# Patient Record
Sex: MALE | Race: AMERICAN INDIAN OR ALASKA NATIVE | NOT HISPANIC OR LATINO
[De-identification: names, ages, dates, MRNs, and addresses within clinical notes are randomized per-mention and may not be internally consistent; named-entity substitution may affect disease eponyms.]

---

## 2021-09-01 DIAGNOSIS — F63.9 IMPULSE DISORDER, UNSPECIFIED: ICD-10-CM

## 2021-09-01 DIAGNOSIS — S98.112A COMPLETE TRAUMATIC AMPUTATION OF LEFT GREAT TOE, INITIAL ENCOUNTER: ICD-10-CM

## 2021-09-23 ENCOUNTER — APPOINTMENT (OUTPATIENT)
Dept: PEDIATRIC DEVELOPMENTAL SERVICES | Facility: CLINIC | Age: 26
End: 2021-09-23
Payer: MEDICAID

## 2021-09-23 ENCOUNTER — NON-APPOINTMENT (OUTPATIENT)
Age: 26
End: 2021-09-23

## 2021-09-23 ENCOUNTER — OUTPATIENT (OUTPATIENT)
Dept: OUTPATIENT SERVICES | Facility: HOSPITAL | Age: 26
LOS: 1 days | Discharge: HOME | End: 2021-09-23
Payer: MEDICAID

## 2021-09-23 ENCOUNTER — APPOINTMENT (OUTPATIENT)
Dept: INTERNAL MEDICINE | Facility: CLINIC | Age: 26
End: 2021-09-23
Payer: MEDICAID

## 2021-09-23 VITALS
DIASTOLIC BLOOD PRESSURE: 76 MMHG | SYSTOLIC BLOOD PRESSURE: 116 MMHG | OXYGEN SATURATION: 99 % | TEMPERATURE: 97.1 F | HEART RATE: 91 BPM

## 2021-09-23 DIAGNOSIS — Z82.49 FAMILY HISTORY OF ISCHEMIC HEART DISEASE AND OTHER DISEASES OF THE CIRCULATORY SYSTEM: ICD-10-CM

## 2021-09-23 DIAGNOSIS — Z83.3 FAMILY HISTORY OF DIABETES MELLITUS: ICD-10-CM

## 2021-09-23 PROCEDURE — 93010 ELECTROCARDIOGRAM REPORT: CPT

## 2021-09-23 PROCEDURE — 99214 OFFICE O/P EST MOD 30 MIN: CPT | Mod: 25,GC

## 2021-09-23 RX ORDER — OMEPRAZOLE 40 MG/1
40 CAPSULE, DELAYED RELEASE ORAL
Qty: 42 | Refills: 0 | Status: COMPLETED | COMMUNITY
Start: 2021-09-23 | End: 2021-11-04

## 2021-09-23 NOTE — HISTORY OF PRESENT ILLNESS
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 Presented to the office to establish care. The patient was accompanied by 2 aides: Doreen Boyd and Yoselin Diaz ( manager of [facility] home).

## 2021-09-23 NOTE — END OF VISIT
[] : Resident [FreeTextEntry3] : Pt. is a 27yo male with autism, cerebral palsy, GERD, blind left eye due to congenital cataract, acne, s/p tumor removed from left foot.  Here to establish care.  Had Covid vaccine, Tdap and flu vaccine given today.  Blood work and EKG ordered.  RTC 3 months.  Neuro referral for seizure, ophthal. referral for congenital left cataract, and podiatry given h/o tumor removal from left foot\par

## 2021-09-23 NOTE — ASSESSMENT
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 and left foot tumor (unspecified, s/p left 2nd toe amputation), seizure disorder. Presented to the office to establish care. The patient was accompanied by 2 aides: Doreen Boyd and Yoselin Coy ( manager of residency home). \par \par #Seizure disorder:\par - On Topiramate 50mg po q12hrs and Valproic acid 750mg (15mL) po q12hrs for behavior disorder\par - No seizure activity since he has at his new residency\par - Neurology referral \par \par #GERD: \par - Patient complains of a sore throat. has been off PPIs\par - Restart Omeprazole 40mg po once daily for 6 weeks only\par \par #Mood disorder \par #Borderline Schizophrenia\par #ASD\par #Impulse disorder \par - Continue with Chlorpromazine 25mg po q12hrs, topiramate 50mg Q12 and valproic acid 750mg Q12\par - the patient is verbal with intellectual disability\par - Follow up QTc on EKG\par \par #Acne: \par - Continue Clindamycin 1% gel 2 times a day after showering\par - Controlled now\par - Aides requesting dermatology referral \par \par #Left foot tumor: \par - Unknown primary ( no tissue biopsy)\par - s/p 2nd left toe amputation\par - 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 \par - Covid-19 received. They do not have the full dates, to be documented next time.

## 2021-09-23 NOTE — PHYSICAL EXAM
[No Acute Distress] : no acute distress [Well Nourished] : well nourished [Well Developed] : well developed [Well-Appearing] : well-appearing [Normal Sclera/Conjunctiva] : normal sclera/conjunctiva [PERRL] : pupils equal round and reactive to light [EOMI] : extraocular movements intact [Normal Outer Ear/Nose] : the outer ears and nose were normal in appearance [Normal Oropharynx] : the oropharynx was normal [No JVD] : no jugular venous distention [Supple] : supple [No Respiratory Distress] : no respiratory distress  [No Accessory Muscle Use] : no accessory muscle use [Clear to Auscultation] : lungs were clear to auscultation bilaterally [Normal Rate] : normal rate  [Regular Rhythm] : with a regular rhythm [Normal S1, S2] : normal S1 and S2 [Soft] : abdomen soft [Non Tender] : non-tender [Non-distended] : non-distended [No HSM] : no HSM [No CVA Tenderness] : no CVA  tenderness [No Spinal Tenderness] : no spinal tenderness [No Joint Swelling] : no joint swelling [No Rash] : no rash [No Focal Deficits] : no focal deficits [Normal Gait] : normal gait [Normal] : normal gait, coordination grossly intact, no focal deficits and deep tendon reflexes were 2+ and symmetric [de-identified] : ASD, intellectual disability

## 2021-09-27 LAB
25(OH)D3 SERPL-MCNC: 61 NG/ML
ALBUMIN SERPL ELPH-MCNC: 4.4 G/DL
ALP BLD-CCNC: 82 U/L
ALT SERPL-CCNC: 13 U/L
ANION GAP SERPL CALC-SCNC: 13 MMOL/L
AST SERPL-CCNC: 24 U/L
BASOPHILS # BLD AUTO: 0.04 K/UL
BASOPHILS NFR BLD AUTO: 0.5 %
BILIRUB SERPL-MCNC: 0.3 MG/DL
BUN SERPL-MCNC: 19 MG/DL
CALCIUM SERPL-MCNC: 9.8 MG/DL
CHLORIDE SERPL-SCNC: 106 MMOL/L
CO2 SERPL-SCNC: 23 MMOL/L
CREAT SERPL-MCNC: 0.9 MG/DL
EOSINOPHIL # BLD AUTO: 0.34 K/UL
EOSINOPHIL NFR BLD AUTO: 4.6 %
ESTIMATED AVERAGE GLUCOSE: 114 MG/DL
GLUCOSE SERPL-MCNC: 84 MG/DL
HBA1C MFR BLD HPLC: 5.6 %
HCT VFR BLD CALC: 43.9 %
HGB BLD-MCNC: 13.3 G/DL
IMM GRANULOCYTES NFR BLD AUTO: 0.4 %
LYMPHOCYTES # BLD AUTO: 3.05 K/UL
LYMPHOCYTES NFR BLD AUTO: 41.4 %
MAN DIFF?: NORMAL
MCHC RBC-ENTMCNC: 22.6 PG
MCHC RBC-ENTMCNC: 30.3 G/DL
MCV RBC AUTO: 74.7 FL
MONOCYTES # BLD AUTO: 0.92 K/UL
MONOCYTES NFR BLD AUTO: 12.5 %
NEUTROPHILS # BLD AUTO: 2.99 K/UL
NEUTROPHILS NFR BLD AUTO: 40.6 %
PLATELET # BLD AUTO: 158 K/UL
POTASSIUM SERPL-SCNC: 4.5 MMOL/L
PROT SERPL-MCNC: 7.2 G/DL
RBC # BLD: 5.88 M/UL
RBC # FLD: 15.6 %
SODIUM SERPL-SCNC: 142 MMOL/L
TSH SERPL-ACNC: 4.34 UIU/ML
WBC # FLD AUTO: 7.37 K/UL

## 2021-09-29 DIAGNOSIS — K21.9 GASTRO-ESOPHAGEAL REFLUX DISEASE WITHOUT ESOPHAGITIS: ICD-10-CM

## 2021-09-29 DIAGNOSIS — G40.909 EPILEPSY, UNSPECIFIED, NOT INTRACTABLE, WITHOUT STATUS EPILEPTICUS: ICD-10-CM

## 2021-09-29 DIAGNOSIS — L70.9 ACNE, UNSPECIFIED: ICD-10-CM

## 2021-09-29 DIAGNOSIS — H54.40 BLINDNESS, ONE EYE, UNSPECIFIED EYE: ICD-10-CM

## 2021-09-29 DIAGNOSIS — Q12.0 CONGENITAL CATARACT: ICD-10-CM

## 2021-09-29 DIAGNOSIS — F84.0 AUTISTIC DISORDER: ICD-10-CM

## 2021-09-29 DIAGNOSIS — Z00.00 ENCOUNTER FOR GENERAL ADULT MEDICAL EXAMINATION WITHOUT ABNORMAL FINDINGS: ICD-10-CM

## 2021-09-29 DIAGNOSIS — Z23 ENCOUNTER FOR IMMUNIZATION: ICD-10-CM

## 2021-10-20 ENCOUNTER — APPOINTMENT (OUTPATIENT)
Dept: PODIATRY | Facility: CLINIC | Age: 26
End: 2021-10-20
Payer: MEDICAID

## 2021-10-20 ENCOUNTER — OUTPATIENT (OUTPATIENT)
Dept: OUTPATIENT SERVICES | Facility: HOSPITAL | Age: 26
LOS: 1 days | Discharge: HOME | End: 2021-10-20

## 2021-10-20 ENCOUNTER — NON-APPOINTMENT (OUTPATIENT)
Age: 26
End: 2021-10-20

## 2021-10-20 PROCEDURE — 99203 OFFICE O/P NEW LOW 30 MIN: CPT | Mod: NC,25

## 2021-10-20 PROCEDURE — 11721 DEBRIDE NAIL 6 OR MORE: CPT | Mod: NC

## 2021-10-20 NOTE — HISTORY OF PRESENT ILLNESS
[FreeTextEntry1] : 26 M presents from a group home for evaluation of his feet, in 2015 he had a left 2nd digit amputation.

## 2021-10-20 NOTE — ASSESSMENT
[FreeTextEntry1] : Patient presents for a foot examination. No signs of infection present, patients feet are in healthy condition. \par \par Nails sharply debrided x9. \par \par Pt to retrn to clinic in 2 months.

## 2021-10-20 NOTE — PHYSICAL EXAM
[No Joint Swelling] : no joint swelling [Normal Foot/Ankle] : Both lower extremities were exposed and visualized. Standing exam demonstrates normal foot posture and alignment. Hindfoot exam shows no hindfoot valgus or varus [Sensation] : the sensory exam was normal to light touch and pinprick [No Focal Deficits] : no focal deficits [Deep Tendon Reflexes (DTR)] : deep tendon reflexes were 2+ and symmetric [Ankle Swelling (On Exam)] : not present [Varicose Veins Of Lower Extremities] : not present [] : not present

## 2021-10-22 DIAGNOSIS — M79.672 PAIN IN LEFT FOOT: ICD-10-CM

## 2021-10-22 DIAGNOSIS — R26.2 DIFFICULTY IN WALKING, NOT ELSEWHERE CLASSIFIED: ICD-10-CM

## 2021-10-22 DIAGNOSIS — B35.1 TINEA UNGUIUM: ICD-10-CM

## 2021-10-22 DIAGNOSIS — M79.671 PAIN IN RIGHT FOOT: ICD-10-CM

## 2021-10-22 DIAGNOSIS — G40.909 EPILEPSY, UNSPECIFIED, NOT INTRACTABLE, WITHOUT STATUS EPILEPTICUS: ICD-10-CM

## 2021-10-22 DIAGNOSIS — G80.9 CEREBRAL PALSY, UNSPECIFIED: ICD-10-CM

## 2021-11-09 ENCOUNTER — OUTPATIENT (OUTPATIENT)
Dept: OUTPATIENT SERVICES | Facility: HOSPITAL | Age: 26
LOS: 1 days | Discharge: HOME | End: 2021-11-09
Payer: MEDICAID

## 2021-11-09 ENCOUNTER — APPOINTMENT (OUTPATIENT)
Dept: OPHTHALMOLOGY | Facility: CLINIC | Age: 26
End: 2021-11-09

## 2021-11-09 PROCEDURE — 92004 COMPRE OPH EXAM NEW PT 1/>: CPT

## 2021-11-10 ENCOUNTER — TRANSCRIPTION ENCOUNTER (OUTPATIENT)
Age: 26
End: 2021-11-10

## 2021-11-30 ENCOUNTER — APPOINTMENT (OUTPATIENT)
Dept: PODIATRY | Facility: CLINIC | Age: 26
End: 2021-11-30

## 2021-12-03 ENCOUNTER — APPOINTMENT (OUTPATIENT)
Dept: OPHTHALMOLOGY | Facility: CLINIC | Age: 26
End: 2021-12-03

## 2021-12-03 ENCOUNTER — OUTPATIENT (OUTPATIENT)
Dept: OUTPATIENT SERVICES | Facility: HOSPITAL | Age: 26
LOS: 1 days | Discharge: HOME | End: 2021-12-03
Payer: MEDICAID

## 2021-12-03 PROCEDURE — 76512 OPH US DX B-SCAN: CPT | Mod: 26,50

## 2021-12-06 ENCOUNTER — OUTPATIENT (OUTPATIENT)
Dept: OUTPATIENT SERVICES | Facility: HOSPITAL | Age: 26
LOS: 1 days | Discharge: HOME | End: 2021-12-06

## 2021-12-06 ENCOUNTER — APPOINTMENT (OUTPATIENT)
Dept: PODIATRY | Facility: CLINIC | Age: 26
End: 2021-12-06
Payer: MEDICAID

## 2021-12-06 PROCEDURE — 11721 DEBRIDE NAIL 6 OR MORE: CPT | Mod: NC

## 2021-12-07 ENCOUNTER — APPOINTMENT (OUTPATIENT)
Dept: PODIATRY | Facility: CLINIC | Age: 26
End: 2021-12-07

## 2021-12-15 ENCOUNTER — EMERGENCY (EMERGENCY)
Facility: HOSPITAL | Age: 26
LOS: 0 days | Discharge: HOME | End: 2021-12-16
Attending: EMERGENCY MEDICINE | Admitting: EMERGENCY MEDICINE
Payer: MEDICAID

## 2021-12-15 VITALS
HEART RATE: 79 BPM | WEIGHT: 121.92 LBS | TEMPERATURE: 98 F | DIASTOLIC BLOOD PRESSURE: 70 MMHG | OXYGEN SATURATION: 100 % | RESPIRATION RATE: 17 BRPM | SYSTOLIC BLOOD PRESSURE: 110 MMHG

## 2021-12-15 DIAGNOSIS — F84.0 AUTISTIC DISORDER: ICD-10-CM

## 2021-12-15 DIAGNOSIS — K08.89 OTHER SPECIFIED DISORDERS OF TEETH AND SUPPORTING STRUCTURES: ICD-10-CM

## 2021-12-15 PROCEDURE — 99282 EMERGENCY DEPT VISIT SF MDM: CPT

## 2021-12-15 NOTE — CONSULT NOTE ADULT - CONSULT REASON
Patient presents with Aide "Yosef" who states patient has been crying at night for the past couple days due to pain in the upper right quadrant.

## 2021-12-15 NOTE — ED PROVIDER NOTE - PHYSICAL EXAMINATION
CONST: NAD  EYES: Sclera and conjunctiva clear.   ENT: Oropharynx poor dentition. No abscess or swelling. Uvula midline. No nasal discharge.   NECK: Non-tender, no meningeal signs. normal ROM. supple   CARD: S1 S2; No jvd  RESP: Equal BS B/L, No wheezes, rhonchi or rales. No distress  GI: Soft, non-tender, non-distended. normal BS  MS: Normal ROM in all extremities. pulses 2 +. no calf tenderness or swelling  SKIN: Warm, dry, no acute rashes. Good turgor  NEURO: Baseline as per aid

## 2021-12-15 NOTE — CONSULT NOTE ADULT - ASSESSMENT
Patient is a 26y old  Male who presents with a chief complaint of dental pain in the upper right quadrant for the past couple days according to the patient's aid.    HPI:  Patient has special needs  - Autistic disorder  - Behavior disorder  - Blindness left eye  - Impulse control disorder    PAST MEDICAL & SURGICAL HISTORY:    ( -  ) heart valve replacement  ( -  ) joint replacement  ( -  ) pregnancy    MEDICATIONS  (STANDING):    MEDICATIONS  (PRN):      Allergies    No Known Allergies    Intolerances        FAMILY HISTORY:      *SOCIAL HISTORY: ( -  ) Tobacco; ( -  ) ETOH    *Last Dental Visit:    Vital Signs Last 24 Hrs  T(C): 36.7 (15 Dec 2021 10:27), Max: 36.7 (15 Dec 2021 10:27)  T(F): 98 (15 Dec 2021 10:27), Max: 98 (15 Dec 2021 10:27)  HR: 79 (15 Dec 2021 10:27) (79 - 79)  BP: 110/70 (15 Dec 2021 10:27) (110/70 - 110/70)  BP(mean): --  RR: 17 (15 Dec 2021 10:27) (17 - 17)  SpO2: 100% (15 Dec 2021 10:27) (100% - 100%)    LABS:                  EOE:  TMJ ( -  ) clicks                     ( -  ) pops                     ( -  ) crepitus             Mandible <<FROM>>             Facial bones and MOM <<grossly intact>>             ( -  ) trismus             ( -  ) lymphadenopathy             ( -  ) swelling             ( -  ) asymmetry             ( -  ) palpation             ( -  ) dyspnea             ( -  ) dysphagia             (-   ) loss of consciousness    IOE:  <<permanent>> dentition: <<grossly intact>>            hard/soft palate:  ( -  ) palatal torus, <<No pathology noted>>           tongue/FOM <<No pathology noted>>           labial/buccal mucosa <<No pathology noted>>           ( -  ) percussion           ( -  ) palpation           ( - ) swelling            ( -  ) abscess           ( -  ) sinus tract    Dentition present: << y  >>  Mobility: << n >>  Caries: <<  n >>         *DENTAL RADIOGRAPHS:  Patient was uncooperative for intra-oral exam and for radiographs    RADIOLOGY & ADDITIONAL STUDIES:    *ASSESSMENT:  - Patient is uncooperative. There is no evidence of swelling, abscess, or laceration present in the mouth.     *PLAN:  - Pediatric resident Dr. Fleming, gave appropriate forms to patient's aid, for patient to be admitted for complete oral rehabilitation in the OR.     PROCEDURE:   Verbal and written consent given.  Anesthesia: <<n    >>   Treatment: <<  n  >>     RECOMMENDATIONS:  1) <<  Informed patient's aide to have power of  Barbara Fuller to sign consent forms for COR. Copy of POA forms made and placed in folder.  >>  2) Dental F/U with outpatient dentist for comprehensive dental care.   3) If any difficulty swallowing/breathing, fever occur, return to ER.     Denis Luque, pager #[phone] Patient is a 26y old  Male who presents with a chief complaint of dental pain in the upper right quadrant for the past couple days according to the patient's aid.    HPI:  Patient has special needs  - Autistic disorder  - Behavior disorder  - Blindness left eye  - Impulse control disorder    PAST MEDICAL & SURGICAL HISTORY:    ( -  ) heart valve replacement  ( -  ) joint replacement  ( -  ) pregnancy    MEDICATIONS  (STANDING):    MEDICATIONS  (PRN):      Allergies    No Known Allergies    Intolerances        FAMILY HISTORY:      *SOCIAL HISTORY: ( -  ) Tobacco; ( -  ) ETOH    *Last Dental Visit:    Vital Signs Last 24 Hrs  T(C): 36.7 (15 Dec 2021 10:27), Max: 36.7 (15 Dec 2021 10:27)  T(F): 98 (15 Dec 2021 10:27), Max: 98 (15 Dec 2021 10:27)  HR: 79 (15 Dec 2021 10:27) (79 - 79)  BP: 110/70 (15 Dec 2021 10:27) (110/70 - 110/70)  BP(mean): --  RR: 17 (15 Dec 2021 10:27) (17 - 17)  SpO2: 100% (15 Dec 2021 10:27) (100% - 100%)    LABS:                  EOE:  TMJ ( -  ) clicks                     ( -  ) pops                     ( -  ) crepitus             Mandible <<FROM>>             Facial bones and MOM <<grossly intact>>             ( -  ) trismus             ( -  ) lymphadenopathy             ( -  ) swelling             ( -  ) asymmetry             ( -  ) palpation             ( -  ) dyspnea             ( -  ) dysphagia             (-   ) loss of consciousness    IOE:  <<permanent>> dentition: <<grossly intact>>            hard/soft palate:  ( -  ) palatal torus, <<No pathology noted>>           tongue/FOM <<No pathology noted>>           labial/buccal mucosa <<No pathology noted>>           ( -  ) percussion           ( -  ) palpation           ( - ) swelling            ( -  ) abscess           ( -  ) sinus tract    Dentition present: << y  >>  Mobility: << n >>  Caries: <<  n >>         *DENTAL RADIOGRAPHS:  Patient was uncooperative for intra-oral exam and for radiographs    RADIOLOGY & ADDITIONAL STUDIES:    *ASSESSMENT:  - Patient is uncooperative. There is no evidence of swelling, abscess, or laceration present in the mouth.     *PLAN:  - Pediatric resident Dr. Fleming, gave appropriate forms to patient's aid, for patient to be admitted for complete oral rehabilitation in the OR.     PROCEDURE:   Verbal and written consent given.  Anesthesia: <<n    >>   Treatment: <<  n  >>     RECOMMENDATIONS:  1) <<  Informed patient's aide to have power of  Barbara Fuller to sign consent forms for COR. Copy of POA forms made and placed in folder.  Amoxicilllin 500mg sent to pharmacy>>  2) Dental F/U with outpatient dentist for comprehensive dental care.   3) If any difficulty swallowing/breathing, fever occur, return to ER.     Denis Luque, pager #[phone]

## 2021-12-15 NOTE — ED PROVIDER NOTE - NS ED ROS FT
Constitutional: (-) fever  Eyes/ENT: (+) dental pain, (-) blurry vision, (-) epistaxis  Cardiovascular: (-) chest pain, (-) syncope  Respiratory: (-) cough, (-) shortness of breath  Gastrointestinal: (-) vomiting, (-) diarrhea  Musculoskeletal: (-) neck pain, (-) back pain, (-) joint pain  Integumentary: (-) rash, (-) edema  Neurological: (-) headache, (-) altered mental status  Allergic/Immunologic: (-) pruritus

## 2021-12-15 NOTE — HISTORY OF PRESENT ILLNESS
[FreeTextEntry1] : 26 M presents from a group home for evaluation of his feet, in 2015 he had a left 2nd digit amputation. P

## 2021-12-15 NOTE — ED ADULT NURSE NOTE - NSHOSCREENINGQ1_ED_ALL_ED
OFFICE VISIT      Patient: Sweta Go Date of Service: 2019   : 1974 MRN: [id number]     SUBJECTIVE:   HISTORY OF PRESENT ILLNESS:  Sweta Go is a 38year old male who presents today for F/U HTN CHOL AVR REPAIR  RX SAME  CV F/U UPCOMING  GERD ON PPI   STILL HAS THROAT SYMPTOMS  GI EVAL  CHRONIC PAIN   CHEST WALL   RIGHT KNEE ACL    Chief Complaint   Patient presents with   â¢ Physical     refills and pt feels someting in back of throat and pain in left side of neck       HPI    PAST MEDICAL HISTORY:  History reviewed. No pertinent past medical history. MEDICATIONS:  Current Outpatient Medications   Medication Sig   â¢ potassium chloride (K-TAB) 20 MEQ ER tablet Take by mouth daily. â¢ aspirin (ECOTRIN) 81 MG EC tablet    â¢ atorvastatin (LIPITOR) 40 MG tablet    â¢ carvedilol (COREG) 12.5 MG tablet    â¢ enalapril (VASOTEC) 10 MG tablet    â¢ furosemide (LASIX) 40 MG tablet Take by mouth daily. â¢ pantoprazole (PROTONIX) 40 MG tablet    â¢ ranolazine (RANEXA) 500 MG 12 hr tablet Take by mouth every 12 hours. â¢ warfarin (COUMADIN) 10 MG tablet Take by mouth daily. â¢ traZODone (DESYREL) 100 MG tablet Take by mouth daily. â¢ tadalafil (CIALIS) 5 MG tablet    â¢ sucralfate (CARAFATE) 1 g tablet    â¢ HYDROcodone-acetaminophen (NORCO) 5-325 MG per tablet Take 2 tablets by mouth every 6 hours as needed for Pain. â¢ tramadol (ULTRAM-ER) 100 MG 24 hr tablet Take 1 tablet by mouth 3 times daily as needed for Pain. No current facility-administered medications for this visit. ALLERGIES:  ALLERGIES:   Allergen Reactions   â¢ Iodine   (Environmental Or Med) RASH       PAST SURGICAL HISTORY:  History reviewed. No pertinent surgical history. FAMILY HISTORY:  History reviewed. No pertinent family history.     SOCIAL HISTORY:  Social History     Tobacco Use   â¢ Smoking status: Former Smoker   â¢ Smokeless tobacco: Never Used   Substance Use Topics   â¢ Alcohol use: No     Frequency: Never   â¢ Drug use: No Not on file       Review of Systems   Respiratory: Negative for cough and wheezing. Cardiovascular: Negative for palpitations, orthopnea and leg swelling. Neurological: Negative for focal weakness. OBJECTIVE:     Physical Exam   Constitutional: He is oriented to person, place, and time and well-developed, well-nourished, and in no distress. HENT:   Head: Normocephalic and atraumatic. Right Ear: External ear normal.   Left Ear: External ear normal.   Nose: Nose normal.   Mouth/Throat: Oropharynx is clear and moist.   Eyes: Conjunctivae and EOM are normal. Pupils are equal, round, and reactive to light. Neck: Normal range of motion. Neck supple. Cardiovascular: Normal rate, regular rhythm, normal heart sounds and intact distal pulses. Pulmonary/Chest: Effort normal and breath sounds normal.   Abdominal: Soft. Bowel sounds are normal.   Musculoskeletal: Normal range of motion. Neurological: He is alert and oriented to person, place, and time. Gait normal.   Skin: Skin is warm and dry. Psychiatric: Mood, affect and judgment normal.       Visit Vitals  /80   Pulse 80   Temp 98.1 Â°F (36.7 Â°C)   Ht 6' (1.829 m)   Wt (!) 138.9 kg (306 lb 1.7 oz)   BMI 41.52 kg/mÂ²       DIAGNOSTIC STUDIES:   LAB RESULTS:  No visits with results within 1 Month(s) from this visit.    Latest known visit with results is:   Lab Services on 06/10/2018   Component Date Value Ref Range Status   â¢ WHITE BLOOD COUNT 06/10/2018 5.0  4.2 - 11.0 K/mcL Final   â¢ RED CELL COUNT 06/10/2018 3.04* 4.50 - 5.90 mil/mcL Final   â¢ HEMOGLOBIN 06/10/2018 9.3* 13.0 - 17.0 g/dl Final   â¢ HEMATOCRIT 06/10/2018 28.4* 39.0 - 51.0 % Final   â¢ MEAN CORPUSCULAR VOLUME 06/10/2018 93.4  78.0 - 100.0 fL Final   â¢ MEAN CORPUSCULAR HEMOGLOBIN 06/10/2018 30.6  26.0 - 34.0 pg Final   â¢ MEAN CORPUSCULAR HGB CONC 06/10/2018 32.7  32.0 - 36.5 g/dl Final   â¢ RDW-CV 06/10/2018 13.3  11.0 - 15.0 % Final   â¢ PLATELET COUNT 74/98/1864 296  140 - 450 K/mcL Final   â¢ Neutrophil 06/10/2018 70  % Final   â¢ LYMPH 06/10/2018 13  % Final   â¢ MONO 06/10/2018 10  % Final   â¢ EOSIN 06/10/2018 6  % Final   â¢ BASO 06/10/2018 1  % Final   â¢ Absolute Neutrophil 06/10/2018 3.5  1.8 - 7.7 K/mcL Final   â¢ Absolute Lymph 06/10/2018 0.7* 1.0 - 4.8 K/mcL Final   â¢ Absolute Mono 06/10/2018 0.5  0.3 - 0.9 K/mcL Final   â¢ Absolute Eos 06/10/2018 0.3  0.1 - 0.5 K/mcL Final   â¢ Absolute Baso 06/10/2018 0.1  0.0 - 0.3 K/mcL Final   â¢ SEG 24/50/8140 NOT APPLICABLE    Final   â¢ DIFF TYPE 06/10/2018 AUTOMATED DIFFERENTIAL    Final   â¢ Analyzer ANC 25/82/5939 NOT APPLICABLE    Final   â¢ NRBC 06/10/2018 0  0 /100 WBC Final   â¢ Percent Immature Granuloctyes 06/10/2018 0  % Final   â¢ Absolute Immature Granulocytes 06/10/2018 0  0 - 0.2 K/mcL Final       Assessment AND PLAN:   This is a 38year old year-old male who presents with       Jean Marie Nixons was seen today for physical.    Diagnoses and all orders for this visit:    Chest pain, musculoskeletal  -     HYDROcodone-acetaminophen (NORCO) 5-325 MG per tablet; Take 2 tablets by mouth every 6 hours as needed for Pain. -     tramadol (ULTRAM-ER) 100 MG 24 hr tablet; Take 1 tablet by mouth 3 times daily as needed for Pain. Rupture of anterior cruciate ligament of right knee, subsequent encounter  -     HYDROcodone-acetaminophen (NORCO) 5-325 MG per tablet; Take 2 tablets by mouth every 6 hours as needed for Pain. -     tramadol (ULTRAM-ER) 100 MG 24 hr tablet; Take 1 tablet by mouth 3 times daily as needed for Pain. Chronic GERD  -     SERVICE TO GASTROENTEROLOGY    Hypercholesterolemia    S/P AVR (aortic valve replacement)    Essential hypertension          Please take medications as directed. Instructions provided as documented in the AVS.    Return in about 3 months (around 5/12/2019) for hypertension. The patient indicated understanding of the diagnosis and agreed with the plan of care.

## 2021-12-15 NOTE — PROCEDURE
[FreeTextEntry1] :  \par Pt. seen and evaluated\par Aseptic debridement nails  x9. \par Pt to return to clinic in 2 months.

## 2021-12-15 NOTE — ED ADULT NURSE NOTE - NSIMPLEMENTINTERV_GEN_ALL_ED
Implemented All Universal Safety Interventions:  [place] to call system. Call bell, personal items and telephone within reach. Instruct patient to call for assistance. Room bathroom lighting operational. Non-slip footwear when patient is off stretcher. Physically safe environment: no spills, clutter or unnecessary equipment. Stretcher in lowest position, wheels locked, appropriate side rails in place.

## 2021-12-15 NOTE — ED ADULT TRIAGE NOTE - ARRIVAL FROM
[facility] wants AST/ASG repeated for kidney function.  Please enter orders and are they fasting?    [phone]   te[facility] II/Adult home

## 2021-12-15 NOTE — ED PROVIDER NOTE - OBJECTIVE STATEMENT
26y M pmh autism presents for eval of dental pain. Pt has R upper dental pain x1wk, no aggravating or relieving factors.

## 2021-12-15 NOTE — ASSESSMENT
[FreeTextEntry1] : Patient presents for a foot examination. No signs of infection present, patients feet are in healthy condition. \par Onychomysosis, B/L\par \par

## 2021-12-17 DIAGNOSIS — B35.1 TINEA UNGUIUM: ICD-10-CM

## 2021-12-17 DIAGNOSIS — M79.671 PAIN IN RIGHT FOOT: ICD-10-CM

## 2021-12-17 DIAGNOSIS — M79.672 PAIN IN LEFT FOOT: ICD-10-CM

## 2021-12-17 DIAGNOSIS — G80.9 CEREBRAL PALSY, UNSPECIFIED: ICD-10-CM

## 2022-01-12 ENCOUNTER — NON-APPOINTMENT (OUTPATIENT)
Age: 27
End: 2022-01-12

## 2022-02-08 ENCOUNTER — APPOINTMENT (OUTPATIENT)
Dept: PODIATRY | Facility: CLINIC | Age: 27
End: 2022-02-08
Payer: MEDICAID

## 2022-02-08 ENCOUNTER — OUTPATIENT (OUTPATIENT)
Dept: OUTPATIENT SERVICES | Facility: HOSPITAL | Age: 27
LOS: 1 days | Discharge: HOME | End: 2022-02-08

## 2022-02-08 PROCEDURE — 11721 DEBRIDE NAIL 6 OR MORE: CPT | Mod: NC

## 2022-02-08 NOTE — HISTORY OF PRESENT ILLNESS
[FreeTextEntry1] : 26 M presents from a group home for evaluation of his feet, in 2015 he had a left 2nd digit amputation. Here today for management of elongated nails

## 2022-02-22 DIAGNOSIS — M79.674 PAIN IN RIGHT TOE(S): ICD-10-CM

## 2022-02-22 DIAGNOSIS — B35.1 TINEA UNGUIUM: ICD-10-CM

## 2022-03-08 ENCOUNTER — APPOINTMENT (OUTPATIENT)
Dept: DERMATOLOGY | Facility: CLINIC | Age: 27
End: 2022-03-08
Payer: MEDICAID

## 2022-03-08 ENCOUNTER — OUTPATIENT (OUTPATIENT)
Dept: OUTPATIENT SERVICES | Facility: HOSPITAL | Age: 27
LOS: 1 days | Discharge: HOME | End: 2022-03-08

## 2022-03-08 PROCEDURE — 99203 OFFICE O/P NEW LOW 30 MIN: CPT | Mod: GC

## 2022-03-08 NOTE — END OF VISIT
[] : Resident [FreeTextEntry3] : Facial acne well controlled, continue current treatment.\par f/u with PCP

## 2022-03-08 NOTE — HISTORY OF PRESENT ILLNESS
[FreeTextEntry1] : acne  [de-identified] : 26-year-old, male patient, PMHx: Acne, ASD, Blindness of the left eye ( congenital eye cataract), Cerebral palsy, and left foot tumor (unspecified, s/p left 2nd toe amputation) presents for evaluation of acne. Is using topical clindamycin (started using 9/2021) with significant relief of sx. Acne is present on face, not involving torso or extremities has been present for a few months. Pt is accompanied by aid who notes he occasionally picks at lesions.

## 2022-03-08 NOTE — PHYSICAL EXAM
[Alert] : alert [Well Nourished] : well nourished [Conjunctiva Non-injected] : conjunctiva non-injected [No Visual Lymphadenopathy] : no visual  lymphadenopathy [No Clubbing] : no clubbing [No Edema] : no edema [Full Body Skin Exam Performed] : performed [FreeTextEntry1] : +L corneal oacification  [FreeTextEntry3] : Multiple subtle areas of scaring over face, small comedone on upper bridge of nose and left lateral cheek

## 2022-03-08 NOTE — ASSESSMENT
[FreeTextEntry1] : #acne \par - present on face, no lesions on trunk. Multiple subtle healing scars from previous nodules, 2 small comedones (upper bridge of nose, left lateral cheek), appears very well controlled on current regimen\par - using topical clindamycin since 9/2021 with relief in sx \par - c/w topical clindamycin \par - f/u PRN

## 2022-03-23 ENCOUNTER — NON-APPOINTMENT (OUTPATIENT)
Age: 27
End: 2022-03-23

## 2022-03-23 ENCOUNTER — APPOINTMENT (OUTPATIENT)
Dept: PEDIATRIC DEVELOPMENTAL SERVICES | Facility: CLINIC | Age: 27
End: 2022-03-23
Payer: MEDICAID

## 2022-03-23 ENCOUNTER — OUTPATIENT (OUTPATIENT)
Dept: OUTPATIENT SERVICES | Facility: HOSPITAL | Age: 27
LOS: 1 days | Discharge: HOME | End: 2022-03-23

## 2022-03-23 VITALS
WEIGHT: 132 LBS | OXYGEN SATURATION: 97 % | HEART RATE: 81 BPM | HEIGHT: 63 IN | SYSTOLIC BLOOD PRESSURE: 115 MMHG | BODY MASS INDEX: 23.39 KG/M2 | TEMPERATURE: 97.2 F | DIASTOLIC BLOOD PRESSURE: 71 MMHG

## 2022-03-23 PROCEDURE — 99214 OFFICE O/P EST MOD 30 MIN: CPT | Mod: GC

## 2022-03-23 RX ORDER — VALPROIC ACID 250 MG/5ML
250 SOLUTION ORAL
Refills: 0 | Status: DISCONTINUED | COMMUNITY
End: 2022-03-23

## 2022-03-23 NOTE — REVIEW OF SYSTEMS
[Skin Rash] : skin rash [Chest Pain] : no chest pain [Palpitations] : no palpitations [Shortness Of Breath] : no shortness of breath [Wheezing] : no wheezing [Abdominal Pain] : no abdominal pain [Nausea] : no nausea [Vomiting] : no vomiting [Heartburn] : no heartburn [Dysuria] : no dysuria [Incontinence] : no incontinence [Hesitancy] : no hesitancy [Joint Pain] : no joint pain [Back Pain] : no back pain [Headache] : no headache [de-identified] : Acne scar

## 2022-03-23 NOTE — PHYSICAL EXAM
[No Acute Distress] : no acute distress [Well Nourished] : well nourished [Well Developed] : well developed [Well-Appearing] : well-appearing [Normal Sclera/Conjunctiva] : normal sclera/conjunctiva [Normal Outer Ear/Nose] : the outer ears and nose were normal in appearance [No JVD] : no jugular venous distention [No Respiratory Distress] : no respiratory distress  [No Accessory Muscle Use] : no accessory muscle use [Clear to Auscultation] : lungs were clear to auscultation bilaterally [Normal Rate] : normal rate  [Regular Rhythm] : with a regular rhythm [Normal S1, S2] : normal S1 and S2 [Soft] : abdomen soft [Non Tender] : non-tender [Non-distended] : non-distended [No CVA Tenderness] : no CVA  tenderness [No Spinal Tenderness] : no spinal tenderness [No Joint Swelling] : no joint swelling [No Rash] : no rash [No Focal Deficits] : no focal deficits [Normal Gait] : normal gait [Normal] : normal gait, coordination grossly intact, no focal deficits and deep tendon reflexes were 2+ and symmetric [EOMI] : extraocular movements intact [de-identified] : intellectual disability

## 2022-03-23 NOTE — HISTORY OF PRESENT ILLNESS
[FreeTextEntry1] : Follow up [de-identified] : 26 year old, male with past medical history of Acne, ASD, Blindness of the left eye (congenital eye cataract), Cerebral palsy, and left foot tumor (unspecified, s/p left 2nd toe amputation) presents for follow up.\par \par The patient was accompanied by aide: Lanny Ruiz\par \par No new fevers, diarrhea, constipation, dysuria, chest pain, abdominal pain, nausea, vomiting, diarrhea reported. Patient has occasional behavioral outbursts but that has been a chronic issue. Otherwise doing well at group home

## 2022-03-23 NOTE — END OF VISIT
[] : Resident [FreeTextEntry3] : Pt. here for follow up.  Had TSH 4.34 on 9/27/21, will order blood work.  Medication renewed.  RTC 6 months for annual physical exam or prn

## 2022-03-23 NOTE — ASSESSMENT
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) presents for follow up.\par \par # Seizure disorder:\par - Continue Topiramate 50mg po q12hrs and Valproic acid 750mg (15mL) po q12hrs\par - No seizure activity reported\par - Neurology following\par \par # GERD: \par - Improved\par - Off Protonix for now\par \par # Mood disorder \par # Borderline Schizophrenia\par # ASD\par # Impulse disorder \par - Continue with Chlorpromazine 25mg po q12hrs, topiramate 50mg Q12 and valproic acid 750mg Q12\par - the patient is verbal with intellectual disability\par \par # Acne: \par - Continue Clindamycin 1% gel 2 times a day after showering\par - Derm following (Dr. Tellez)\par \par # Left foot tumor: \par # oncho mycosis\par - Unknown primary (no tissue biopsy)\par - s/p 2nd left toe amputation\par - Podiatry following\par \par # Congenital left eye blindness :\par - Following Ophthalmology \par \par # Hypothyroidism\par - TSH 4.34, will repeat\par - No symptoms reported from aide or patient\par \par #HCM: \par - Flu shot and Tdap given\par - Covid-19 taken\par Follow up in 6 months and PRN

## 2022-03-29 DIAGNOSIS — B35.1 TINEA UNGUIUM: ICD-10-CM

## 2022-03-29 DIAGNOSIS — L70.9 ACNE, UNSPECIFIED: ICD-10-CM

## 2022-03-29 DIAGNOSIS — K21.9 GASTRO-ESOPHAGEAL REFLUX DISEASE WITHOUT ESOPHAGITIS: ICD-10-CM

## 2022-03-29 DIAGNOSIS — G80.9 CEREBRAL PALSY, UNSPECIFIED: ICD-10-CM

## 2022-03-29 DIAGNOSIS — G40.909 EPILEPSY, UNSPECIFIED, NOT INTRACTABLE, WITHOUT STATUS EPILEPTICUS: ICD-10-CM

## 2022-04-19 ENCOUNTER — OUTPATIENT (OUTPATIENT)
Dept: OUTPATIENT SERVICES | Facility: HOSPITAL | Age: 27
LOS: 1 days | Discharge: HOME | End: 2022-04-19

## 2022-04-19 ENCOUNTER — APPOINTMENT (OUTPATIENT)
Dept: PODIATRY | Facility: CLINIC | Age: 27
End: 2022-04-19
Payer: MEDICAID

## 2022-04-19 PROCEDURE — 99212 OFFICE O/P EST SF 10 MIN: CPT | Mod: NC

## 2022-04-19 NOTE — ASSESSMENT
[FreeTextEntry1] : Patient presents for a foot examination. No signs of infection present, patients feet are in healthy condition. \par Onychomysosis, B/L\par history of toe amputation for a tumor? has tenderness over the left hallux\par referred for left foor xrays. Please obtain xrays prior to follow up\par nails debrided x bilateral feet\par return one month\par

## 2022-04-20 LAB
ALBUMIN SERPL ELPH-MCNC: 4.6 G/DL
ALP BLD-CCNC: 81 U/L
ALT SERPL-CCNC: 17 U/L
ANION GAP SERPL CALC-SCNC: 11 MMOL/L
AST SERPL-CCNC: 21 U/L
BASOPHILS # BLD AUTO: 0.03 K/UL
BASOPHILS NFR BLD AUTO: 0.4 %
BILIRUB SERPL-MCNC: 0.3 MG/DL
BUN SERPL-MCNC: 14 MG/DL
CALCIUM SERPL-MCNC: 9.7 MG/DL
CHLORIDE SERPL-SCNC: 102 MMOL/L
CO2 SERPL-SCNC: 25 MMOL/L
CREAT SERPL-MCNC: 0.8 MG/DL
EGFR: 126 ML/MIN/1.73M2
EOSINOPHIL # BLD AUTO: 0.09 K/UL
EOSINOPHIL NFR BLD AUTO: 1.2 %
GLUCOSE SERPL-MCNC: 92 MG/DL
HCT VFR BLD CALC: 44.2 %
HGB BLD-MCNC: 13.7 G/DL
IMM GRANULOCYTES NFR BLD AUTO: 0.4 %
LYMPHOCYTES # BLD AUTO: 3.06 K/UL
LYMPHOCYTES NFR BLD AUTO: 41.5 %
MAN DIFF?: NORMAL
MCHC RBC-ENTMCNC: 23 PG
MCHC RBC-ENTMCNC: 31 G/DL
MCV RBC AUTO: 74.3 FL
MONOCYTES # BLD AUTO: 0.89 K/UL
MONOCYTES NFR BLD AUTO: 12.1 %
NEUTROPHILS # BLD AUTO: 3.27 K/UL
NEUTROPHILS NFR BLD AUTO: 44.4 %
PLATELET # BLD AUTO: 166 K/UL
POTASSIUM SERPL-SCNC: 4 MMOL/L
PROT SERPL-MCNC: 7.2 G/DL
RBC # BLD: 5.95 M/UL
RBC # FLD: 15.6 %
SODIUM SERPL-SCNC: 138 MMOL/L
TSH SERPL-ACNC: 3.47 UIU/ML
WBC # FLD AUTO: 7.37 K/UL

## 2022-04-21 LAB — 25(OH)D3 SERPL-MCNC: 49 NG/ML

## 2022-04-26 DIAGNOSIS — M79.674 PAIN IN RIGHT TOE(S): ICD-10-CM

## 2022-04-26 DIAGNOSIS — B35.1 TINEA UNGUIUM: ICD-10-CM

## 2022-04-26 DIAGNOSIS — D49.2 NEOPLASM OF UNSPECIFIED BEHAVIOR OF BONE, SOFT TISSUE, AND SKIN: ICD-10-CM

## 2022-05-10 ENCOUNTER — APPOINTMENT (OUTPATIENT)
Dept: OPHTHALMOLOGY | Facility: CLINIC | Age: 27
End: 2022-05-10

## 2022-06-28 ENCOUNTER — APPOINTMENT (OUTPATIENT)
Dept: PODIATRY | Facility: CLINIC | Age: 27
End: 2022-06-28
Payer: MEDICAID

## 2022-06-28 ENCOUNTER — OUTPATIENT (OUTPATIENT)
Dept: OUTPATIENT SERVICES | Facility: HOSPITAL | Age: 27
LOS: 1 days | Discharge: HOME | End: 2022-06-28

## 2022-06-28 PROCEDURE — 11721 DEBRIDE NAIL 6 OR MORE: CPT | Mod: NC

## 2022-06-28 NOTE — HISTORY OF PRESENT ILLNESS
[FreeTextEntry1] : 27 M presents from a group home for evaluation of his feet, in 2015 he had a left 2nd digit amputation. Here today for management of elongated dystrophic nails

## 2022-06-28 NOTE — ASSESSMENT
[FreeTextEntry1] : Patient presents for a foot examination. No signs of infection present, patients feet are in healthy condition. \par Onychomysosis, B/L\par no xrays obtained\par nails debrided x  10 \par return 2  months\par

## 2022-08-16 ENCOUNTER — APPOINTMENT (OUTPATIENT)
Dept: PODIATRY | Facility: CLINIC | Age: 27
End: 2022-08-16

## 2022-08-23 ENCOUNTER — OUTPATIENT (OUTPATIENT)
Dept: OUTPATIENT SERVICES | Facility: HOSPITAL | Age: 27
LOS: 1 days | Discharge: HOME | End: 2022-08-23

## 2022-08-23 ENCOUNTER — APPOINTMENT (OUTPATIENT)
Dept: DERMATOLOGY | Facility: CLINIC | Age: 27
End: 2022-08-23

## 2022-08-23 PROCEDURE — 99213 OFFICE O/P EST LOW 20 MIN: CPT | Mod: GC

## 2022-08-23 NOTE — ASSESSMENT
[FreeTextEntry1] : #acne \par - present on face, no lesions on trunk, welll controlled on current regimen \par - using topical clindamycin since 9/2021 with relief in sx \par - c/w topical clindamycin \par - f/u PRN

## 2022-08-23 NOTE — HISTORY OF PRESENT ILLNESS
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 Acne is present on face, not involving torso or extremities. Pt is accompanied by aid who notes he occasionally picks at lesions but overall has greatly improved.

## 2022-08-23 NOTE — END OF VISIT
[] : Resident [FreeTextEntry3] : Mild facial acne controlled by topical medication.\par Annual exam recommended.

## 2022-09-22 RX ORDER — LACTOSE-REDUCED FOOD 0.06 G-1.5
LIQUID (ML) ORAL
Refills: 0 | Status: DISCONTINUED | COMMUNITY
End: 2022-09-22

## 2022-09-26 ENCOUNTER — APPOINTMENT (OUTPATIENT)
Dept: PEDIATRIC DEVELOPMENTAL SERVICES | Facility: CLINIC | Age: 27
End: 2022-09-26

## 2022-09-26 ENCOUNTER — OUTPATIENT (OUTPATIENT)
Dept: OUTPATIENT SERVICES | Facility: HOSPITAL | Age: 27
LOS: 1 days | Discharge: HOME | End: 2022-09-26

## 2022-09-26 ENCOUNTER — NON-APPOINTMENT (OUTPATIENT)
Age: 27
End: 2022-09-26

## 2022-09-26 VITALS
SYSTOLIC BLOOD PRESSURE: 125 MMHG | BODY MASS INDEX: 24.8 KG/M2 | OXYGEN SATURATION: 96 % | WEIGHT: 140 LBS | HEART RATE: 92 BPM | TEMPERATURE: 96.7 F | DIASTOLIC BLOOD PRESSURE: 84 MMHG | HEIGHT: 63 IN

## 2022-09-26 DIAGNOSIS — Z23 ENCOUNTER FOR IMMUNIZATION: ICD-10-CM

## 2022-09-26 DIAGNOSIS — Q12.0 CONGENITAL CATARACT: ICD-10-CM

## 2022-09-26 PROCEDURE — 99214 OFFICE O/P EST MOD 30 MIN: CPT | Mod: 25,GC

## 2022-09-26 NOTE — HISTORY OF PRESENT ILLNESS
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 No new complaints. The patient was accompanied by aide: Sandy Oliveira\par

## 2022-09-26 NOTE — ASSESSMENT
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 Tellez)\par \par # Left foot tumor: \par # onychomycosis\par - Unknown primary (no tissue biopsy)\par - s/p 2nd left toe amputation\par - Podiatry following last visit 6/28/2022 \par \par # Congenital left eye cataract and blindness :\par - Following Ophthalmology \par \par # Hypothyroidism\par - TSH 3.47 (4/20/2022) , will repeat\par - No symptoms reported from aide or patient\par \par #HCM: \par - Flu shot given today (9/26/2022)\par - Covid-19 2 doses \par Follow up in 6 months and PRN

## 2022-09-26 NOTE — PHYSICAL EXAM
[No Acute Distress] : no acute distress [Well Nourished] : well nourished [Well Developed] : well developed [Well-Appearing] : well-appearing [Normal Sclera/Conjunctiva] : normal sclera/conjunctiva [EOMI] : extraocular movements intact [Normal Outer Ear/Nose] : the outer ears and nose were normal in appearance [No JVD] : no jugular venous distention [No Respiratory Distress] : no respiratory distress  [No Accessory Muscle Use] : no accessory muscle use [Clear to Auscultation] : lungs were clear to auscultation bilaterally [Normal Rate] : normal rate  [Regular Rhythm] : with a regular rhythm [Normal S1, S2] : normal S1 and S2 [Soft] : abdomen soft [Non Tender] : non-tender [Non-distended] : non-distended [No CVA Tenderness] : no CVA  tenderness [No Spinal Tenderness] : no spinal tenderness [No Joint Swelling] : no joint swelling [No Rash] : no rash [No Focal Deficits] : no focal deficits [Normal Gait] : normal gait [Normal] : normal gait, coordination grossly intact, no focal deficits and deep tendon reflexes were 2+ and symmetric [de-identified] : intellectual disability

## 2022-09-26 NOTE — REVIEW OF SYSTEMS
[Skin Rash] : skin rash [Chest Pain] : no chest pain [Palpitations] : no palpitations [Shortness Of Breath] : no shortness of breath [Wheezing] : no wheezing [Abdominal Pain] : no abdominal pain [Nausea] : no nausea [Vomiting] : no vomiting [Heartburn] : no heartburn [Dysuria] : no dysuria [Incontinence] : no incontinence [Hesitancy] : no hesitancy [Joint Pain] : no joint pain [Back Pain] : no back pain [Headache] : no headache [de-identified] : Acne scar

## 2022-09-26 NOTE — END OF VISIT
[] : Resident [FreeTextEntry3] : Pt.here for follow up.  Flu vaccine given today.  Medication renewed.  Blood work ordered.  RTC 6 months or prn

## 2022-09-29 DIAGNOSIS — D49.2 NEOPLASM OF UNSPECIFIED BEHAVIOR OF BONE, SOFT TISSUE, AND SKIN: ICD-10-CM

## 2022-09-29 DIAGNOSIS — K21.9 GASTRO-ESOPHAGEAL REFLUX DISEASE WITHOUT ESOPHAGITIS: ICD-10-CM

## 2022-09-29 DIAGNOSIS — E03.9 HYPOTHYROIDISM, UNSPECIFIED: ICD-10-CM

## 2022-09-29 DIAGNOSIS — Z23 ENCOUNTER FOR IMMUNIZATION: ICD-10-CM

## 2022-09-29 DIAGNOSIS — L70.9 ACNE, UNSPECIFIED: ICD-10-CM

## 2022-09-29 DIAGNOSIS — Z00.00 ENCOUNTER FOR GENERAL ADULT MEDICAL EXAMINATION WITHOUT ABNORMAL FINDINGS: ICD-10-CM

## 2022-11-11 ENCOUNTER — APPOINTMENT (OUTPATIENT)
Dept: INTERNAL MEDICINE | Facility: CLINIC | Age: 27
End: 2022-11-11

## 2022-12-09 ENCOUNTER — APPOINTMENT (OUTPATIENT)
Dept: OPHTHALMOLOGY | Facility: CLINIC | Age: 27
End: 2022-12-09

## 2022-12-13 ENCOUNTER — APPOINTMENT (OUTPATIENT)
Dept: OPHTHALMOLOGY | Facility: CLINIC | Age: 27
End: 2022-12-13

## 2022-12-13 ENCOUNTER — OUTPATIENT (OUTPATIENT)
Dept: OUTPATIENT SERVICES | Facility: HOSPITAL | Age: 27
LOS: 1 days | Discharge: HOME | End: 2022-12-13

## 2022-12-13 PROCEDURE — 92014 COMPRE OPH EXAM EST PT 1/>: CPT

## 2023-03-06 ENCOUNTER — OUTPATIENT (OUTPATIENT)
Dept: OUTPATIENT SERVICES | Facility: HOSPITAL | Age: 28
LOS: 1 days | End: 2023-03-06
Payer: MEDICAID

## 2023-03-06 DIAGNOSIS — Z01.20 ENCOUNTER FOR DENTAL EXAMINATION AND CLEANING WITHOUT ABNORMAL FINDINGS: ICD-10-CM

## 2023-03-06 PROCEDURE — D0150: CPT

## 2023-03-07 DIAGNOSIS — Z01.21 ENCOUNTER FOR DENTAL EXAMINATION AND CLEANING WITH ABNORMAL FINDINGS: ICD-10-CM

## 2023-03-09 LAB
ALBUMIN SERPL ELPH-MCNC: 4.4 G/DL
ALP BLD-CCNC: 83 U/L
ALT SERPL-CCNC: 26 U/L
ANION GAP SERPL CALC-SCNC: 11 MMOL/L
AST SERPL-CCNC: 29 U/L
BASOPHILS # BLD AUTO: 0.03 K/UL
BASOPHILS NFR BLD AUTO: 0.4 %
BILIRUB SERPL-MCNC: 0.3 MG/DL
BUN SERPL-MCNC: 15 MG/DL
CALCIUM SERPL-MCNC: 9.9 MG/DL
CHLORIDE SERPL-SCNC: 107 MMOL/L
CHOLEST SERPL-MCNC: 165 MG/DL
CO2 SERPL-SCNC: 24 MMOL/L
CREAT SERPL-MCNC: 0.9 MG/DL
EGFR: 121 ML/MIN/1.73M2
EOSINOPHIL # BLD AUTO: 0.08 K/UL
EOSINOPHIL NFR BLD AUTO: 1.2 %
ESTIMATED AVERAGE GLUCOSE: 128 MG/DL
GLUCOSE SERPL-MCNC: 89 MG/DL
HBA1C MFR BLD HPLC: 6.1 %
HCT VFR BLD CALC: 45.9 %
HDLC SERPL-MCNC: 36 MG/DL
HGB BLD-MCNC: 13.9 G/DL
IMM GRANULOCYTES NFR BLD AUTO: 0.3 %
LDLC SERPL CALC-MCNC: 101 MG/DL
LYMPHOCYTES # BLD AUTO: 3.57 K/UL
LYMPHOCYTES NFR BLD AUTO: 53.1 %
MAN DIFF?: NORMAL
MCHC RBC-ENTMCNC: 22.4 PG
MCHC RBC-ENTMCNC: 30.3 G/DL
MCV RBC AUTO: 74 FL
MONOCYTES # BLD AUTO: 0.8 K/UL
MONOCYTES NFR BLD AUTO: 11.9 %
NEUTROPHILS # BLD AUTO: 2.22 K/UL
NEUTROPHILS NFR BLD AUTO: 33.1 %
NONHDLC SERPL-MCNC: 129 MG/DL
PLATELET # BLD AUTO: 182 K/UL
POTASSIUM SERPL-SCNC: 4.8 MMOL/L
PROT SERPL-MCNC: 7.3 G/DL
RBC # BLD: 6.2 M/UL
RBC # FLD: 17.2 %
SODIUM SERPL-SCNC: 142 MMOL/L
TRIGL SERPL-MCNC: 139 MG/DL
TSH SERPL-ACNC: 2.18 UIU/ML
WBC # FLD AUTO: 6.72 K/UL

## 2023-03-27 ENCOUNTER — NON-APPOINTMENT (OUTPATIENT)
Age: 28
End: 2023-03-27

## 2023-03-27 ENCOUNTER — APPOINTMENT (OUTPATIENT)
Dept: PEDIATRIC DEVELOPMENTAL SERVICES | Facility: CLINIC | Age: 28
End: 2023-03-27
Payer: MEDICAID

## 2023-03-27 ENCOUNTER — APPOINTMENT (OUTPATIENT)
Dept: PEDIATRIC DEVELOPMENTAL SERVICES | Facility: CLINIC | Age: 28
End: 2023-03-27

## 2023-03-27 ENCOUNTER — OUTPATIENT (OUTPATIENT)
Dept: OUTPATIENT SERVICES | Facility: HOSPITAL | Age: 28
LOS: 1 days | End: 2023-03-27
Payer: MEDICAID

## 2023-03-27 VITALS
HEIGHT: 63 IN | OXYGEN SATURATION: 98 % | WEIGHT: 146 LBS | HEART RATE: 94 BPM | BODY MASS INDEX: 25.87 KG/M2 | DIASTOLIC BLOOD PRESSURE: 82 MMHG | SYSTOLIC BLOOD PRESSURE: 120 MMHG

## 2023-03-27 DIAGNOSIS — F84.0 AUTISTIC DISORDER: ICD-10-CM

## 2023-03-27 DIAGNOSIS — Z00.00 ENCOUNTER FOR GENERAL ADULT MEDICAL EXAMINATION WITHOUT ABNORMAL FINDINGS: ICD-10-CM

## 2023-03-27 PROCEDURE — 99214 OFFICE O/P EST MOD 30 MIN: CPT | Mod: GC

## 2023-03-27 PROCEDURE — 99214 OFFICE O/P EST MOD 30 MIN: CPT | Mod: 95

## 2023-03-27 RX ORDER — DIVALPROEX SODIUM 125 MG/1
125 CAPSULE, COATED PELLETS ORAL
Refills: 0 | Status: ACTIVE | COMMUNITY

## 2023-03-27 RX ORDER — CHLORPROMAZINE HCL 25 MG
25 TABLET ORAL
Refills: 0 | Status: ACTIVE | COMMUNITY

## 2023-03-27 RX ORDER — TOPIRAMATE 50 MG/1
50 TABLET, FILM COATED ORAL TWICE DAILY
Qty: 60 | Refills: 0 | Status: ACTIVE | COMMUNITY

## 2023-03-27 NOTE — END OF VISIT
[] : Resident [FreeTextEntry3] : Pt. here for follow up.  Had blood work done 3/9/23 , triglyceride 139, TSH 2.18, A1C 6.1.  Medication renewed.  Blood work ordered.  RTC 6 months.\par

## 2023-03-27 NOTE — ASSESSMENT
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 Tellez)\par \par # Left foot tumor: \par # onychomycosis\par - Unknown primary (no tissue biopsy)\par - s/p 2nd left toe amputation\par - Podiatry following last visit 6/28/2022 \par \par # Congenital left eye cataract and blindness :\par - Following Ophthalmology \par \par #HCM: \par - Flu shot given today (9/26/2022)\par - Covid-19 2 doses \par Follow up in 6 months and PRN.

## 2023-03-27 NOTE — HISTORY OF PRESENT ILLNESS
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 No new complaints. The patient was accompanied by aide: Pancho Cisneros \par

## 2023-03-27 NOTE — PHYSICAL EXAM
[No Acute Distress] : no acute distress [Well Nourished] : well nourished [Well Developed] : well developed [Well-Appearing] : well-appearing [Normal Sclera/Conjunctiva] : normal sclera/conjunctiva [Normal Outer Ear/Nose] : the outer ears and nose were normal in appearance [No JVD] : no jugular venous distention [No Respiratory Distress] : no respiratory distress  [No Accessory Muscle Use] : no accessory muscle use [Clear to Auscultation] : lungs were clear to auscultation bilaterally [Normal Rate] : normal rate  [Regular Rhythm] : with a regular rhythm [Normal S1, S2] : normal S1 and S2 [No Murmur] : no murmur heard [No Carotid Bruits] : no carotid bruits [Soft] : abdomen soft [Non Tender] : non-tender [Non-distended] : non-distended [No Masses] : no abdominal mass palpated [No HSM] : no HSM [Normal Bowel Sounds] : normal bowel sounds

## 2023-03-27 NOTE — REVIEW OF SYSTEMS
[Fever] : no fever [Chills] : no chills [Fatigue] : no fatigue [Hot Flashes] : no hot flashes [Night Sweats] : no night sweats [Recent Change In Weight] : ~T no recent weight change [Discharge] : no discharge [Earache] : no earache [Chest Pain] : no chest pain [Palpitations] : no palpitations [Claudication] : no  leg claudication [Lower Ext Edema] : no lower extremity edema [Orthopena] : no orthopnea [Paroxysmal Nocturnal Dyspnea] : no paroxysmal nocturnal dyspnea [Shortness Of Breath] : no shortness of breath [Wheezing] : no wheezing [Cough] : no cough [Dyspnea on Exertion] : not dyspnea on exertion [Abdominal Pain] : no abdominal pain [Nausea] : no nausea [Constipation] : no constipation [Diarrhea] : no diarrhea [Vomiting] : no vomiting [Heartburn] : no heartburn [Melena] : no melena [Dysuria] : no dysuria [Joint Pain] : no joint pain [Itching] : no itching [Headache] : no headache

## 2023-03-29 DIAGNOSIS — K21.9 GASTRO-ESOPHAGEAL REFLUX DISEASE WITHOUT ESOPHAGITIS: ICD-10-CM

## 2023-03-29 DIAGNOSIS — L70.9 ACNE, UNSPECIFIED: ICD-10-CM

## 2023-03-29 DIAGNOSIS — Z00.00 ENCOUNTER FOR GENERAL ADULT MEDICAL EXAMINATION WITHOUT ABNORMAL FINDINGS: ICD-10-CM

## 2023-03-29 DIAGNOSIS — R73.03 PREDIABETES: ICD-10-CM

## 2023-03-29 DIAGNOSIS — H54.40 BLINDNESS, ONE EYE, UNSPECIFIED EYE: ICD-10-CM

## 2023-03-29 DIAGNOSIS — D50.9 IRON DEFICIENCY ANEMIA, UNSPECIFIED: ICD-10-CM

## 2023-04-02 ENCOUNTER — NON-APPOINTMENT (OUTPATIENT)
Age: 28
End: 2023-04-02

## 2023-04-26 ENCOUNTER — OUTPATIENT (OUTPATIENT)
Dept: OUTPATIENT SERVICES | Facility: HOSPITAL | Age: 28
LOS: 1 days | End: 2023-04-26
Payer: MEDICAID

## 2023-04-26 DIAGNOSIS — Z00.8 ENCOUNTER FOR OTHER GENERAL EXAMINATION: ICD-10-CM

## 2023-04-26 DIAGNOSIS — R22.9 LOCALIZED SWELLING, MASS AND LUMP, UNSPECIFIED: ICD-10-CM

## 2023-04-26 PROCEDURE — 93971 EXTREMITY STUDY: CPT | Mod: 26,LT

## 2023-04-26 PROCEDURE — 93971 EXTREMITY STUDY: CPT | Mod: LT

## 2023-04-27 DIAGNOSIS — R22.9 LOCALIZED SWELLING, MASS AND LUMP, UNSPECIFIED: ICD-10-CM

## 2023-08-10 RX ORDER — CLINDAMYCIN PHOSPHATE 1 G/10ML
1 GEL TOPICAL
Qty: 1 | Refills: 10 | Status: ACTIVE | COMMUNITY
Start: 1900-01-01 | End: 1900-01-01

## 2023-08-22 ENCOUNTER — APPOINTMENT (OUTPATIENT)
Dept: DERMATOLOGY | Facility: CLINIC | Age: 28
End: 2023-08-22

## 2023-10-18 ENCOUNTER — APPOINTMENT (OUTPATIENT)
Dept: PEDIATRIC DEVELOPMENTAL SERVICES | Facility: CLINIC | Age: 28
End: 2023-10-18
Payer: MEDICAID

## 2023-10-18 ENCOUNTER — OUTPATIENT (OUTPATIENT)
Dept: OUTPATIENT SERVICES | Facility: HOSPITAL | Age: 28
LOS: 1 days | End: 2023-10-18
Payer: MEDICAID

## 2023-10-18 VITALS
WEIGHT: 146 LBS | HEIGHT: 63 IN | DIASTOLIC BLOOD PRESSURE: 83 MMHG | HEART RATE: 94 BPM | SYSTOLIC BLOOD PRESSURE: 120 MMHG | RESPIRATION RATE: 18 BRPM | TEMPERATURE: 98 F | BODY MASS INDEX: 25.87 KG/M2 | OXYGEN SATURATION: 98 %

## 2023-10-18 DIAGNOSIS — Z00.00 ENCOUNTER FOR GENERAL ADULT MEDICAL EXAMINATION W/OUT ABNORMAL FINDINGS: ICD-10-CM

## 2023-10-18 DIAGNOSIS — G80.9 CEREBRAL PALSY, UNSPECIFIED: ICD-10-CM

## 2023-10-18 DIAGNOSIS — K21.9 GASTRO-ESOPHAGEAL REFLUX DISEASE W/OUT ESOPHAGITIS: ICD-10-CM

## 2023-10-18 DIAGNOSIS — H54.40 BLINDNESS, ONE EYE, UNSPECIFIED EYE: ICD-10-CM

## 2023-10-18 DIAGNOSIS — Z00.00 ENCOUNTER FOR GENERAL ADULT MEDICAL EXAMINATION WITHOUT ABNORMAL FINDINGS: ICD-10-CM

## 2023-10-18 PROCEDURE — 90471 IMMUNIZATION ADMIN: CPT | Mod: 25

## 2023-10-18 PROCEDURE — 99214 OFFICE O/P EST MOD 30 MIN: CPT

## 2023-10-18 PROCEDURE — 99395 PREV VISIT EST AGE 18-39: CPT

## 2023-10-18 PROCEDURE — 90686 IIV4 VACC NO PRSV 0.5 ML IM: CPT

## 2023-10-19 DIAGNOSIS — L70.9 ACNE, UNSPECIFIED: ICD-10-CM

## 2023-10-19 DIAGNOSIS — Z00.00 ENCOUNTER FOR GENERAL ADULT MEDICAL EXAMINATION WITHOUT ABNORMAL FINDINGS: ICD-10-CM

## 2023-10-19 DIAGNOSIS — K21.9 GASTRO-ESOPHAGEAL REFLUX DISEASE WITHOUT ESOPHAGITIS: ICD-10-CM

## 2023-10-19 DIAGNOSIS — D50.9 IRON DEFICIENCY ANEMIA, UNSPECIFIED: ICD-10-CM

## 2023-10-19 DIAGNOSIS — R73.03 PREDIABETES: ICD-10-CM

## 2023-10-19 DIAGNOSIS — H54.40 BLINDNESS, ONE EYE, UNSPECIFIED EYE: ICD-10-CM

## 2023-10-19 DIAGNOSIS — Z23 ENCOUNTER FOR IMMUNIZATION: ICD-10-CM

## 2023-11-09 ENCOUNTER — APPOINTMENT (OUTPATIENT)
Dept: DERMATOLOGY | Facility: CLINIC | Age: 28
End: 2023-11-09

## 2023-11-13 ENCOUNTER — OUTPATIENT (OUTPATIENT)
Dept: OUTPATIENT SERVICES | Facility: HOSPITAL | Age: 28
LOS: 1 days | End: 2023-11-13
Payer: MEDICAID

## 2023-11-13 ENCOUNTER — LABORATORY RESULT (OUTPATIENT)
Age: 28
End: 2023-11-13

## 2023-11-13 DIAGNOSIS — Z00.00 ENCOUNTER FOR GENERAL ADULT MEDICAL EXAMINATION WITHOUT ABNORMAL FINDINGS: ICD-10-CM

## 2023-11-13 LAB
25(OH)D3 SERPL-MCNC: 45 NG/ML
ALBUMIN SERPL ELPH-MCNC: 4 G/DL
ALP BLD-CCNC: 66 U/L
ALT SERPL-CCNC: 15 U/L
ANION GAP SERPL CALC-SCNC: 11 MMOL/L
AST SERPL-CCNC: 18 U/L
BASOPHILS # BLD AUTO: 0.04 K/UL
BASOPHILS NFR BLD AUTO: 0.5 %
BILIRUB SERPL-MCNC: 0.3 MG/DL
BUN SERPL-MCNC: 14 MG/DL
CALCIUM SERPL-MCNC: 10.1 MG/DL
CHLORIDE SERPL-SCNC: 106 MMOL/L
CHOLEST SERPL-MCNC: 167 MG/DL
CO2 SERPL-SCNC: 24 MMOL/L
CREAT SERPL-MCNC: 0.8 MG/DL
EGFR: 124 ML/MIN/1.73M2
EOSINOPHIL # BLD AUTO: 0.09 K/UL
EOSINOPHIL NFR BLD AUTO: 1.1 %
ESTIMATED AVERAGE GLUCOSE: 131 MG/DL
GLUCOSE SERPL-MCNC: 88 MG/DL
HBA1C MFR BLD HPLC: 6.2 %
HCT VFR BLD CALC: 45.3 %
HDLC SERPL-MCNC: 38 MG/DL
HGB BLD-MCNC: 13.9 G/DL
IMM GRANULOCYTES NFR BLD AUTO: 0.2 %
IRON SATN MFR SERPL: 36 %
IRON SERPL-MCNC: 104 UG/DL
LDLC SERPL CALC-MCNC: 95 MG/DL
LYMPHOCYTES # BLD AUTO: 4.34 K/UL
LYMPHOCYTES NFR BLD AUTO: 53.3 %
MAGNESIUM SERPL-MCNC: 2 MG/DL
MAN DIFF?: NORMAL
MCHC RBC-ENTMCNC: 23.1 PG
MCHC RBC-ENTMCNC: 30.7 G/DL
MCV RBC AUTO: 75.2 FL
MONOCYTES # BLD AUTO: 0.82 K/UL
MONOCYTES NFR BLD AUTO: 10.1 %
NEUTROPHILS # BLD AUTO: 2.84 K/UL
NEUTROPHILS NFR BLD AUTO: 34.8 %
NONHDLC SERPL-MCNC: 129 MG/DL
PLATELET # BLD AUTO: 190 K/UL
POTASSIUM SERPL-SCNC: 4.4 MMOL/L
PROT SERPL-MCNC: 6.9 G/DL
RBC # BLD: 6.02 M/UL
RBC # FLD: 15.9 %
SODIUM SERPL-SCNC: 141 MMOL/L
TIBC SERPL-MCNC: 288 UG/DL
TRIGL SERPL-MCNC: 170 MG/DL
TSH SERPL-ACNC: 6.38 UIU/ML
UIBC SERPL-MCNC: 184 UG/DL
WBC # FLD AUTO: 8.15 K/UL

## 2023-11-13 PROCEDURE — 83735 ASSAY OF MAGNESIUM: CPT

## 2023-11-13 PROCEDURE — 82306 VITAMIN D 25 HYDROXY: CPT

## 2023-11-13 PROCEDURE — 84439 ASSAY OF FREE THYROXINE: CPT

## 2023-11-13 PROCEDURE — 85027 COMPLETE CBC AUTOMATED: CPT

## 2023-11-13 PROCEDURE — 80061 LIPID PANEL: CPT

## 2023-11-13 PROCEDURE — 82728 ASSAY OF FERRITIN: CPT

## 2023-11-13 PROCEDURE — 83540 ASSAY OF IRON: CPT

## 2023-11-13 PROCEDURE — 83036 HEMOGLOBIN GLYCOSYLATED A1C: CPT

## 2023-11-13 PROCEDURE — 80053 COMPREHEN METABOLIC PANEL: CPT

## 2023-11-13 PROCEDURE — 83550 IRON BINDING TEST: CPT

## 2023-11-13 PROCEDURE — 84443 ASSAY THYROID STIM HORMONE: CPT

## 2023-11-14 DIAGNOSIS — Z00.00 ENCOUNTER FOR GENERAL ADULT MEDICAL EXAMINATION WITHOUT ABNORMAL FINDINGS: ICD-10-CM

## 2023-11-14 LAB — FERRITIN SERPL-MCNC: 141 NG/ML

## 2024-01-23 ENCOUNTER — NON-APPOINTMENT (OUTPATIENT)
Age: 29
End: 2024-01-23

## 2024-01-26 ENCOUNTER — APPOINTMENT (OUTPATIENT)
Dept: OTOLARYNGOLOGY | Facility: CLINIC | Age: 29
End: 2024-01-26
Payer: MEDICAID

## 2024-01-26 DIAGNOSIS — H61.22 IMPACTED CERUMEN, LEFT EAR: ICD-10-CM

## 2024-01-26 PROCEDURE — 69210 REMOVE IMPACTED EAR WAX UNI: CPT

## 2024-01-26 NOTE — PHYSICAL EXAM
[Normal] : mucosa is normal [Midline] : trachea located in midline position [de-identified] : cerumen impaction of right ear. Patient adamant for ear cleaning

## 2024-01-26 NOTE — ASSESSMENT
[FreeTextEntry1] : Partially removed was for right ear.  To use Debrox for right ear (M, W, F)  RTC in 6 weeks

## 2024-01-26 NOTE — HISTORY OF PRESENT ILLNESS
[de-identified] : Patient presents today c/o clogged ears. He is accompanied by an aide and is otherwise doing well with no complaints. Aide states no pain in the ears or drainage.

## 2024-02-15 ENCOUNTER — APPOINTMENT (OUTPATIENT)
Dept: DERMATOLOGY | Facility: CLINIC | Age: 29
End: 2024-02-15

## 2024-02-15 ENCOUNTER — OUTPATIENT (OUTPATIENT)
Dept: OUTPATIENT SERVICES | Facility: HOSPITAL | Age: 29
LOS: 1 days | End: 2024-02-15
Payer: MEDICAID

## 2024-02-15 DIAGNOSIS — Z00.00 ENCOUNTER FOR GENERAL ADULT MEDICAL EXAMINATION WITHOUT ABNORMAL FINDINGS: ICD-10-CM

## 2024-02-15 DIAGNOSIS — L70.9 ACNE, UNSPECIFIED: ICD-10-CM

## 2024-02-15 PROCEDURE — 99213 OFFICE O/P EST LOW 20 MIN: CPT

## 2024-02-15 NOTE — ASSESSMENT
[FreeTextEntry1] : # Acne:- mild to moderate - Using Clindamycin 1% gel 2 times a day after showering - continue with topical clindamycin  - F/u 6 months

## 2024-02-15 NOTE — HISTORY OF PRESENT ILLNESS
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 Tellez f/u acne, still with pimples on the face.  Is using topical clindamycin (started using 9/2021) with significant relief of sx, but needs more. Acne is present on face, not involving torso or extremities. Pt is accompanied by fidel David. Says that patient is not always alowing medication application on face.  no other skin complaints.

## 2024-02-16 DIAGNOSIS — L70.9 ACNE, UNSPECIFIED: ICD-10-CM

## 2024-03-06 ENCOUNTER — APPOINTMENT (OUTPATIENT)
Dept: OTOLARYNGOLOGY | Facility: CLINIC | Age: 29
End: 2024-03-06
Payer: MEDICAID

## 2024-03-06 DIAGNOSIS — H61.21 IMPACTED CERUMEN, RIGHT EAR: ICD-10-CM

## 2024-03-06 DIAGNOSIS — H93.8X1 OTHER SPECIFIED DISORDERS OF RIGHT EAR: ICD-10-CM

## 2024-03-06 PROCEDURE — 99213 OFFICE O/P EST LOW 20 MIN: CPT

## 2024-03-06 RX ORDER — ASPIRIN 81 MG
6.5 TABLET, DELAYED RELEASE (ENTERIC COATED) ORAL
Qty: 1 | Refills: 5 | Status: ACTIVE | COMMUNITY
Start: 2024-01-26 | End: 1900-01-01

## 2024-03-06 NOTE — REASON FOR VISIT
[Subsequent Evaluation] : a subsequent evaluation for [FreeTextEntry2] : clogged ears , impacted cerumen

## 2024-03-06 NOTE — ASSESSMENT
[FreeTextEntry1] : Partially cleaned ears. Patient adamant to ear cleaning.  To use Debrox for right ear (M, W, F)  RTC in 6 months

## 2024-03-06 NOTE — HISTORY OF PRESENT ILLNESS
[FreeTextEntry1] : Patient following up today on clogged ears, impacted cerumen. Patient accompanied by aide. Aide denies patient having any trouble hearing.

## 2024-03-06 NOTE — PHYSICAL EXAM
[Normal] : palatine tonsils are normal [Midline] : trachea located in midline position [de-identified] : cerumen impaction of right ear partially removed with curette. Patient adamant for ear cleaning

## 2024-03-27 ENCOUNTER — OUTPATIENT (OUTPATIENT)
Dept: OUTPATIENT SERVICES | Facility: HOSPITAL | Age: 29
LOS: 1 days | End: 2024-03-27
Payer: MEDICAID

## 2024-03-27 ENCOUNTER — APPOINTMENT (OUTPATIENT)
Dept: PEDIATRIC DEVELOPMENTAL SERVICES | Facility: CLINIC | Age: 29
End: 2024-03-27
Payer: MEDICAID

## 2024-03-27 VITALS
HEART RATE: 92 BPM | WEIGHT: 150.56 LBS | TEMPERATURE: 97 F | OXYGEN SATURATION: 97 % | SYSTOLIC BLOOD PRESSURE: 111 MMHG | DIASTOLIC BLOOD PRESSURE: 106 MMHG | BODY MASS INDEX: 26.68 KG/M2 | HEIGHT: 63 IN

## 2024-03-27 DIAGNOSIS — Z00.00 ENCOUNTER FOR GENERAL ADULT MEDICAL EXAMINATION WITHOUT ABNORMAL FINDINGS: ICD-10-CM

## 2024-03-27 DIAGNOSIS — D49.2 NEOPLASM OF UNSPECIFIED BEHAVIOR OF BONE, SOFT TISSUE, AND SKIN: ICD-10-CM

## 2024-03-27 PROCEDURE — 99214 OFFICE O/P EST MOD 30 MIN: CPT | Mod: GC

## 2024-03-27 PROCEDURE — G2211 COMPLEX E/M VISIT ADD ON: CPT | Mod: NC,1L

## 2024-03-27 PROCEDURE — 99214 OFFICE O/P EST MOD 30 MIN: CPT

## 2024-03-27 RX ORDER — LEVOTHYROXINE SODIUM 25 UG/1
25 CAPSULE ORAL DAILY
Qty: 30 | Refills: 3 | Status: ACTIVE | COMMUNITY
Start: 2023-11-13 | End: 1900-01-01

## 2024-03-27 RX ORDER — CLINDAMYCIN PHOSPHATE 1 G/10ML
1 GEL TOPICAL TWICE DAILY
Qty: 1 | Refills: 3 | Status: DISCONTINUED | COMMUNITY
Start: 2024-02-15 | End: 2024-03-27

## 2024-03-27 RX ORDER — MULTIVITAMIN
TABLET ORAL
Qty: 30 | Refills: 5 | Status: ACTIVE | COMMUNITY
Start: 1900-01-01 | End: 1900-01-01

## 2024-03-27 NOTE — ASSESSMENT
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  #Microcytic anemia - Hb 13.9, MCV 74.0 - Iron studies done > ferritin 141 , other iron studies wnl.   #PreDM - HgA1C 6.1 in (March 2023)> 6.2% (Nov 2023)  - Diet and lifestyle modifications , low carb high fiber diet   #Hypertriglyceridemia  - lipid profile done on 11/2023 showed  , LDL 95  - diet and lifestyle modifications, no indication for medications for now  - low carb high fiber diet - DASH diet -  #Hypothyroidism -- primary  - TSH 6.58 , FT4 = 0.8  - c/w levothyroxine 25 mcg. repeat TSH before next visit.   # Mood disorder - c/w current meds - the patient is verbal with intellectual disability  #cerebral pulses #seizure disorder - cw current meds. cw following with neurology  # Acne: - Continue Clindamycin 1% gel 2 times a day after showering - Derm following (Dr. Tellez), seen in Feb 15th 2024   # Left foot tumor # onychomycosis - Unknown primary (no tissue biopsy) - s/p 2nd left toe amputation - Podiatry last visit 6/28/2022 - refer to podiatry.   #Clogged ear, right #Impacted cerumen of right ear  - Followed by [facility] - seen on March 6th 2024 > To use Debrox for right ear (M, W, F) - RTC in 6 months.  # Congenital left eye cataract and blindness - Following Ophthalmology, referral ordered  #HCM: - Flu shot given today 10/18/2023 - Covid-19 2 doses in feb 2021 - Tdap vaccine given sep 2021 --> repeat in sep 2031 - Follow up in 6 months and PRN with repeat labs.

## 2024-03-27 NOTE — HISTORY OF PRESENT ILLNESS
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 No new complaints. The patient was accompanied by aide:  Joann.

## 2024-03-27 NOTE — PHYSICAL EXAM
[No Acute Distress] : no acute distress [Normal Sclera/Conjunctiva] : normal sclera/conjunctiva [Normal Outer Ear/Nose] : the outer ears and nose were normal in appearance [Normal Oropharynx] : the oropharynx was normal [No JVD] : no jugular venous distention [No Respiratory Distress] : no respiratory distress  [No Accessory Muscle Use] : no accessory muscle use [Clear to Auscultation] : lungs were clear to auscultation bilaterally [Normal Rate] : normal rate  [Normal S1, S2] : normal S1 and S2 [No Murmur] : no murmur heard [No Carotid Bruits] : no carotid bruits [No Edema] : there was no peripheral edema [Soft] : abdomen soft [Non Tender] : non-tender [No HSM] : no HSM [No CVA Tenderness] : no CVA  tenderness [No Joint Swelling] : no joint swelling [No Rash] : no rash

## 2024-03-27 NOTE — END OF VISIT
[] : Resident [FreeTextEntry3] : Pt. here for follow up.  Had Tdap 9/23/21, flu vaccine 10/18/23.  Blood work 11/13/23 ferritin 141, iron 104, TIBC 288, % sat. 36, 25-OH Vit. D 45, FT4 0.8, A1C 6.2, TSH 6.38 (levothyroxine 25mcg daily started on 11/13/23).  Medication renewed.  Blood work ordered.  Ophthal. referral for follow up congenital cataract, and podiatry referral for onychomycosis.  Follow derm 8/15/24 for acne, ENT 9/4/24 for ear cleaning.  RTC 6 months or prn.

## 2024-03-28 ENCOUNTER — EMERGENCY (EMERGENCY)
Facility: HOSPITAL | Age: 29
LOS: 0 days | Discharge: HOME | End: 2024-03-28
Attending: STUDENT IN AN ORGANIZED HEALTH CARE EDUCATION/TRAINING PROGRAM
Payer: MEDICAID

## 2024-03-28 VITALS
DIASTOLIC BLOOD PRESSURE: 62 MMHG | TEMPERATURE: 98 F | OXYGEN SATURATION: 99 % | HEART RATE: 98 BPM | SYSTOLIC BLOOD PRESSURE: 123 MMHG | RESPIRATION RATE: 20 BRPM
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
## 2024-03-28 DIAGNOSIS — S51.802A UNSPECIFIED OPEN WOUND OF LEFT FOREARM, INITIAL ENCOUNTER: ICD-10-CM

## 2024-03-28 DIAGNOSIS — M79.632 PAIN IN LEFT FOREARM: ICD-10-CM
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
## 2024-03-28 DIAGNOSIS — Z23 ENCOUNTER FOR IMMUNIZATION: ICD-10-CM

## 2024-03-28 PROCEDURE — 99284 EMERGENCY DEPT VISIT MOD MDM: CPT

## 2024-03-28 PROCEDURE — 90715 TDAP VACCINE 7 YRS/> IM: CPT

## 2024-03-28 PROCEDURE — 90471 IMMUNIZATION ADMIN: CPT

## 2024-03-28 PROCEDURE — 99283 EMERGENCY DEPT VISIT LOW MDM: CPT | Mod: 25

## 2024-03-28 RX ORDER — TETANUS TOXOID, REDUCED DIPHTHERIA TOXOID AND ACELLULAR PERTUSSIS VACCINE, ADSORBED 5; 2.5; 8; 8; 2.5 [IU]/.5ML; [IU]/.5ML; UG/.5ML; UG/.5ML; UG/.5ML
0.5 SUSPENSION INTRAMUSCULAR ONCE
Refills: 0 | Status: COMPLETED | OUTPATIENT
Start: 2024-03-28 | End: 2024-03-28

## 2024-03-28 RX ADMIN — TETANUS TOXOID, REDUCED DIPHTHERIA TOXOID AND ACELLULAR PERTUSSIS VACCINE, ADSORBED 0.5 MILLILITER(S): 5; 2.5; 8; 8; 2.5 SUSPENSION INTRAMUSCULAR at 19:19

## 2024-03-28 RX ADMIN — Medication 1 TABLET(S): at 19:19
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
## 2024-03-28 NOTE — ED PROVIDER NOTE - NSFOLLOWUPINSTRUCTIONS_ED_ALL_ED_FT
clean wounds with soap and water daily, Take augmentin 875 mg 2 times a day for 10 days, See your PMD 2-3 days for wound check, return to ED for increased redness, pain, fever
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
## 2024-03-28 NOTE — ED PROVIDER NOTE - PATIENT PORTAL LINK FT
You can access the FollowMyHealth Patient Portal offered by [facility] by registering at the following website: [email or web address]. By joining [facility]’s FollowMyHealth portal, you will also be able to view your health information using other applications (apps) compatible with our system.
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
## 2024-03-28 NOTE — ED ADULT NURSE NOTE - NSFALLUNIVINTERV_ED_ALL_ED
Bed/Stretcher in lowest position, wheels locked, appropriate side rails in place/Call bell, personal items and telephone in reach/Instruct patient to call for assistance before getting out of bed/chair/stretcher/Non-slip footwear applied when patient is off stretcher/[place] to call system/Physically safe environment - no spills, clutter or unnecessary equipment/Purposeful proactive rounding/Room/bathroom lighting operational, light cord in reach

## 2024-03-29 ENCOUNTER — OUTPATIENT (OUTPATIENT)
Dept: OUTPATIENT SERVICES | Facility: HOSPITAL | Age: 29
LOS: 1 days | End: 2024-03-29
Payer: MEDICAID

## 2024-03-29 ENCOUNTER — LABORATORY RESULT (OUTPATIENT)
Age: 29
End: 2024-03-29

## 2024-03-29 DIAGNOSIS — B35.1 TINEA UNGUIUM: ICD-10-CM

## 2024-03-29 DIAGNOSIS — Z00.00 ENCOUNTER FOR GENERAL ADULT MEDICAL EXAMINATION WITHOUT ABNORMAL FINDINGS: ICD-10-CM

## 2024-03-29 DIAGNOSIS — E03.9 HYPOTHYROIDISM, UNSPECIFIED: ICD-10-CM

## 2024-03-29 DIAGNOSIS — D49.2 NEOPLASM OF UNSPECIFIED BEHAVIOR OF BONE, SOFT TISSUE, AND SKIN: ICD-10-CM

## 2024-03-29 DIAGNOSIS — R73.03 PREDIABETES: ICD-10-CM

## 2024-03-29 DIAGNOSIS — D50.9 IRON DEFICIENCY ANEMIA, UNSPECIFIED: ICD-10-CM

## 2024-03-29 DIAGNOSIS — E78.1 PURE HYPERGLYCERIDEMIA: ICD-10-CM

## 2024-03-29 PROCEDURE — 80053 COMPREHEN METABOLIC PANEL: CPT

## 2024-03-29 PROCEDURE — 80061 LIPID PANEL: CPT

## 2024-03-29 PROCEDURE — 84443 ASSAY THYROID STIM HORMONE: CPT

## 2024-03-29 PROCEDURE — 83036 HEMOGLOBIN GLYCOSYLATED A1C: CPT

## 2024-03-29 PROCEDURE — 85027 COMPLETE CBC AUTOMATED: CPT

## 2024-03-30 DIAGNOSIS — Z00.00 ENCOUNTER FOR GENERAL ADULT MEDICAL EXAMINATION WITHOUT ABNORMAL FINDINGS: ICD-10-CM

## 2024-03-30 LAB
ALBUMIN SERPL ELPH-MCNC: 4.4 G/DL
ALP BLD-CCNC: 70 U/L
ALT SERPL-CCNC: 17 U/L
ANION GAP SERPL CALC-SCNC: 14 MMOL/L
AST SERPL-CCNC: 19 U/L
BASOPHILS # BLD AUTO: 0.04 K/UL
BASOPHILS NFR BLD AUTO: 0.5 %
BILIRUB SERPL-MCNC: 0.3 MG/DL
BUN SERPL-MCNC: 16 MG/DL
CALCIUM SERPL-MCNC: 9.6 MG/DL
CHLORIDE SERPL-SCNC: 107 MMOL/L
CHOLEST SERPL-MCNC: 178 MG/DL
CO2 SERPL-SCNC: 22 MMOL/L
CREAT SERPL-MCNC: 0.8 MG/DL
EGFR: 124 ML/MIN/1.73M2
EOSINOPHIL # BLD AUTO: 0.1 K/UL
EOSINOPHIL NFR BLD AUTO: 1.2 %
ESTIMATED AVERAGE GLUCOSE: 126 MG/DL
GLUCOSE SERPL-MCNC: 94 MG/DL
HBA1C MFR BLD HPLC: 6 %
HCT VFR BLD CALC: 45.4 %
HDLC SERPL-MCNC: 38 MG/DL
HGB BLD-MCNC: 13.7 G/DL
IMM GRANULOCYTES NFR BLD AUTO: 0.3 %
LDLC SERPL CALC-MCNC: 99 MG/DL
LYMPHOCYTES # BLD AUTO: 3.92 K/UL
LYMPHOCYTES NFR BLD AUTO: 45.1 %
MAN DIFF?: NORMAL
MCHC RBC-ENTMCNC: 22.6 PG
MCHC RBC-ENTMCNC: 30.2 G/DL
MCV RBC AUTO: 74.9 FL
MONOCYTES # BLD AUTO: 1 K/UL
MONOCYTES NFR BLD AUTO: 11.5 %
NEUTROPHILS # BLD AUTO: 3.6 K/UL
NEUTROPHILS NFR BLD AUTO: 41.4 %
NONHDLC SERPL-MCNC: 140 MG/DL
PLATELET # BLD AUTO: 184 K/UL
PMV BLD AUTO: 0 /100 WBCS
POTASSIUM SERPL-SCNC: 4.1 MMOL/L
PROT SERPL-MCNC: 6.9 G/DL
RBC # BLD: 6.06 M/UL
RBC # FLD: 16.5 %
SODIUM SERPL-SCNC: 143 MMOL/L
TRIGL SERPL-MCNC: 204 MG/DL
TSH SERPL-ACNC: 3.46 UIU/ML
WBC # FLD AUTO: 8.69 K/UL

## 2024-04-03 ENCOUNTER — APPOINTMENT (OUTPATIENT)
Dept: PEDIATRIC DEVELOPMENTAL SERVICES | Facility: CLINIC | Age: 29
End: 2024-04-03
Payer: MEDICAID

## 2024-04-03 ENCOUNTER — OUTPATIENT (OUTPATIENT)
Dept: OUTPATIENT SERVICES | Facility: HOSPITAL | Age: 29
LOS: 1 days | End: 2024-04-03
Payer: MEDICAID

## 2024-04-03 VITALS
WEIGHT: 148.56 LBS | HEART RATE: 92 BPM | SYSTOLIC BLOOD PRESSURE: 129 MMHG | HEIGHT: 63 IN | BODY MASS INDEX: 26.32 KG/M2 | TEMPERATURE: 97.6 F | DIASTOLIC BLOOD PRESSURE: 85 MMHG | OXYGEN SATURATION: 97 %

## 2024-04-03 DIAGNOSIS — S41.159A OPEN BITE OF UNSPECIFIED UPPER ARM, INITIAL ENCOUNTER: ICD-10-CM

## 2024-04-03 DIAGNOSIS — Z00.00 ENCOUNTER FOR GENERAL ADULT MEDICAL EXAMINATION WITHOUT ABNORMAL FINDINGS: ICD-10-CM

## 2024-04-03 DIAGNOSIS — E78.1 PURE HYPERGLYCERIDEMIA: ICD-10-CM
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
## 2024-04-03 DIAGNOSIS — D50.9 IRON DEFICIENCY ANEMIA, UNSPECIFIED: ICD-10-CM

## 2024-04-03 DIAGNOSIS — R73.03 PREDIABETES: ICD-10-CM
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
## 2024-04-03 DIAGNOSIS — R73.03 PREDIABETES.: ICD-10-CM

## 2024-04-03 DIAGNOSIS — E03.9 HYPOTHYROIDISM, UNSPECIFIED: ICD-10-CM
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
## 2024-04-03 PROCEDURE — G2211 COMPLEX E/M VISIT ADD ON: CPT | Mod: NC,1L

## 2024-04-03 PROCEDURE — 99214 OFFICE O/P EST MOD 30 MIN: CPT

## 2024-04-03 PROCEDURE — 99214 OFFICE O/P EST MOD 30 MIN: CPT | Mod: GC

## 2024-04-03 NOTE — PHYSICAL EXAM
[No Acute Distress] : no acute distress [Normal Outer Ear/Nose] : the outer ears and nose were normal in appearance [Normal Sclera/Conjunctiva] : normal sclera/conjunctiva [No JVD] : no jugular venous distention [Normal Oropharynx] : the oropharynx was normal [No Accessory Muscle Use] : no accessory muscle use [No Respiratory Distress] : no respiratory distress  [Clear to Auscultation] : lungs were clear to auscultation bilaterally [Normal Rate] : normal rate  [No Murmur] : no murmur heard [Normal S1, S2] : normal S1 and S2 [No Carotid Bruits] : no carotid bruits [No Edema] : there was no peripheral edema [Soft] : abdomen soft [Non Tender] : non-tender [No HSM] : no HSM [No CVA Tenderness] : no CVA  tenderness [No Rash] : no rash [No Joint Swelling] : no joint swelling [de-identified] : L arm bite scab
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
## 2024-04-03 NOTE — REVIEW OF SYSTEMS
[FreeTextEntry1] : unable to obtain  [Fever] : no fever [Chills] : no chills [Discharge] : no discharge [Pain] : no pain [Itching] : no itching [Earache] : no earache [Nasal Discharge] : no nasal discharge [Sore Throat] : no sore throat [Chest Pain] : no chest pain [Palpitations] : no palpitations [Shortness Of Breath] : no shortness of breath [Wheezing] : no wheezing [Cough] : no cough [Abdominal Pain] : no abdominal pain [Nausea] : no nausea [Constipation] : no constipation [Diarrhea] : no diarrhea [Vomiting] : no vomiting [Heartburn] : no heartburn [Dysuria] : no dysuria [Hematuria] : no hematuria [Joint Pain] : no joint pain [Back Pain] : no back pain [Skin Rash] : no skin rash [Depression] : no depression [de-identified] : L arm scab
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
## 2024-04-15 ENCOUNTER — OUTPATIENT (OUTPATIENT)
Dept: OUTPATIENT SERVICES | Facility: HOSPITAL | Age: 29
LOS: 1 days | End: 2024-04-15
Payer: MEDICAID

## 2024-04-15 ENCOUNTER — APPOINTMENT (OUTPATIENT)
Dept: PODIATRY | Facility: CLINIC | Age: 29
End: 2024-04-15
Payer: MEDICAID

## 2024-04-15 VITALS
BODY MASS INDEX: 26.22 KG/M2 | DIASTOLIC BLOOD PRESSURE: 81 MMHG | HEIGHT: 63 IN | SYSTOLIC BLOOD PRESSURE: 124 MMHG | WEIGHT: 148 LBS | HEART RATE: 89 BPM

## 2024-04-15 DIAGNOSIS — M79.674 PAIN IN RIGHT TOE(S): ICD-10-CM

## 2024-04-15 DIAGNOSIS — B35.1 TINEA UNGUIUM: ICD-10-CM

## 2024-04-15 DIAGNOSIS — M79.675 PAIN IN RIGHT TOE(S): ICD-10-CM

## 2024-04-15 DIAGNOSIS — Z00.00 ENCOUNTER FOR GENERAL ADULT MEDICAL EXAMINATION WITHOUT ABNORMAL FINDINGS: ICD-10-CM

## 2024-04-15 PROCEDURE — 11721 DEBRIDE NAIL 6 OR MORE: CPT

## 2024-04-17 PROBLEM — B35.1 ONYCHOMYCOSIS: Status: ACTIVE | Noted: 2022-02-08

## 2024-04-17 PROBLEM — M79.674 PAIN IN TOES OF BOTH FEET: Status: ACTIVE | Noted: 2022-02-08

## 2024-04-17 NOTE — PHYSICAL EXAM
[Skin Color & Pigmentation] : normal skin color and pigmentation [Sensation] : the sensory exam was normal to light touch and pinprick [de-identified] : CAvus foot. b/l [FreeTextEntry1] : elongated nail x9.
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
## 2024-04-25 DIAGNOSIS — M79.674 PAIN IN RIGHT TOE(S): ICD-10-CM

## 2024-04-25 DIAGNOSIS — B35.1 TINEA UNGUIUM: ICD-10-CM

## 2024-05-09 ENCOUNTER — NON-APPOINTMENT (OUTPATIENT)
Age: 29
End: 2024-05-09

## 2024-06-22 ENCOUNTER — NON-APPOINTMENT (OUTPATIENT)
Age: 29
End: 2024-06-22

## 2024-07-10 ENCOUNTER — APPOINTMENT (OUTPATIENT)
Dept: PEDIATRIC DEVELOPMENTAL SERVICES | Facility: CLINIC | Age: 29
End: 2024-07-10
Payer: MEDICAID

## 2024-07-10 ENCOUNTER — OUTPATIENT (OUTPATIENT)
Dept: OUTPATIENT SERVICES | Facility: HOSPITAL | Age: 29
LOS: 1 days | End: 2024-07-10
Payer: MEDICAID

## 2024-07-10 VITALS
BODY MASS INDEX: 26.4 KG/M2 | HEART RATE: 104 BPM | DIASTOLIC BLOOD PRESSURE: 85 MMHG | TEMPERATURE: 98.4 F | SYSTOLIC BLOOD PRESSURE: 127 MMHG | OXYGEN SATURATION: 97 % | HEIGHT: 63 IN | WEIGHT: 149 LBS

## 2024-07-10 DIAGNOSIS — E78.1 PURE HYPERGLYCERIDEMIA: ICD-10-CM

## 2024-07-10 DIAGNOSIS — D50.9 IRON DEFICIENCY ANEMIA, UNSPECIFIED: ICD-10-CM

## 2024-07-10 DIAGNOSIS — Z00.00 ENCOUNTER FOR GENERAL ADULT MEDICAL EXAMINATION W/OUT ABNORMAL FINDINGS: ICD-10-CM

## 2024-07-10 DIAGNOSIS — R73.03 PREDIABETES: ICD-10-CM

## 2024-07-10 DIAGNOSIS — Z00.00 ENCOUNTER FOR GENERAL ADULT MEDICAL EXAMINATION WITHOUT ABNORMAL FINDINGS: ICD-10-CM

## 2024-07-10 DIAGNOSIS — L70.9 ACNE, UNSPECIFIED: ICD-10-CM

## 2024-07-10 DIAGNOSIS — R73.03 PREDIABETES.: ICD-10-CM

## 2024-07-10 DIAGNOSIS — E03.9 HYPOTHYROIDISM, UNSPECIFIED: ICD-10-CM

## 2024-07-10 DIAGNOSIS — S41.159A OPEN BITE OF UNSPECIFIED UPPER ARM, INITIAL ENCOUNTER: ICD-10-CM

## 2024-07-10 DIAGNOSIS — H54.40 BLINDNESS, ONE EYE, UNSPECIFIED EYE: ICD-10-CM
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
## 2024-07-10 PROCEDURE — 99214 OFFICE O/P EST MOD 30 MIN: CPT | Mod: GC

## 2024-07-10 PROCEDURE — G2211 COMPLEX E/M VISIT ADD ON: CPT | Mod: NC,1L

## 2024-07-10 PROCEDURE — 99214 OFFICE O/P EST MOD 30 MIN: CPT

## 2024-07-16 ENCOUNTER — APPOINTMENT (OUTPATIENT)
Dept: PODIATRY | Facility: CLINIC | Age: 29
End: 2024-07-16
Payer: MEDICAID

## 2024-07-16 ENCOUNTER — OUTPATIENT (OUTPATIENT)
Dept: OUTPATIENT SERVICES | Facility: HOSPITAL | Age: 29
LOS: 1 days | End: 2024-07-16
Payer: MEDICAID

## 2024-07-16 DIAGNOSIS — M79.675 PAIN IN RIGHT TOE(S): ICD-10-CM

## 2024-07-16 DIAGNOSIS — M79.674 PAIN IN RIGHT TOE(S): ICD-10-CM

## 2024-07-16 DIAGNOSIS — Z00.00 ENCOUNTER FOR GENERAL ADULT MEDICAL EXAMINATION WITHOUT ABNORMAL FINDINGS: ICD-10-CM

## 2024-07-16 DIAGNOSIS — B35.1 TINEA UNGUIUM: ICD-10-CM

## 2024-07-16 PROCEDURE — 11721 DEBRIDE NAIL 6 OR MORE: CPT
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
## 2024-07-26 DIAGNOSIS — M79.674 PAIN IN RIGHT TOE(S): ICD-10-CM
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
## 2024-07-26 DIAGNOSIS — B35.1 TINEA UNGUIUM: ICD-10-CM

## 2024-07-30 ENCOUNTER — OUTPATIENT (OUTPATIENT)
Dept: OUTPATIENT SERVICES | Facility: HOSPITAL | Age: 29
LOS: 1 days | End: 2024-07-30
Payer: MEDICAID

## 2024-07-30 ENCOUNTER — APPOINTMENT (OUTPATIENT)
Dept: OPHTHALMOLOGY | Facility: CLINIC | Age: 29
End: 2024-07-30
Payer: MEDICAID

## 2024-07-30 DIAGNOSIS — H53.8 OTHER VISUAL DISTURBANCES: ICD-10-CM

## 2024-07-30 PROCEDURE — 99203 OFFICE O/P NEW LOW 30 MIN: CPT

## 2024-07-30 PROCEDURE — 99213 OFFICE O/P EST LOW 20 MIN: CPT

## 2024-08-02 ENCOUNTER — EMERGENCY (EMERGENCY)
Facility: HOSPITAL | Age: 29
LOS: 0 days | Discharge: HOME | End: 2024-08-02
Attending: EMERGENCY MEDICINE
Payer: MEDICAID

## 2024-08-02 VITALS
DIASTOLIC BLOOD PRESSURE: 82 MMHG | SYSTOLIC BLOOD PRESSURE: 114 MMHG | TEMPERATURE: 98 F | HEART RATE: 98 BPM | OXYGEN SATURATION: 98 % | RESPIRATION RATE: 18 BRPM

## 2024-08-02 PROCEDURE — 73130 X-RAY EXAM OF HAND: CPT | Mod: LT

## 2024-08-02 PROCEDURE — 26600 TREAT METACARPAL FRACTURE: CPT | Mod: 54,LT

## 2024-08-02 PROCEDURE — 73130 X-RAY EXAM OF HAND: CPT | Mod: 26,LT

## 2024-08-02 PROCEDURE — 99283 EMERGENCY DEPT VISIT LOW MDM: CPT | Mod: 25

## 2024-08-02 PROCEDURE — 29125 APPL SHORT ARM SPLINT STATIC: CPT | Mod: LT

## 2024-08-02 PROCEDURE — 99284 EMERGENCY DEPT VISIT MOD MDM: CPT | Mod: 57

## 2024-08-02 RX ORDER — DIVALPROEX SODIUM 500 MG
6 TABLET, DELAYED RELEASE (ENTERIC COATED) ORAL
Refills: 0 | DISCHARGE

## 2024-08-02 RX ORDER — CHLORPROMAZINE HYDROCHLORIDE 200 MG/1
1 TABLET, FILM COATED ORAL
Refills: 0 | DISCHARGE

## 2024-08-02 RX ORDER — LEVOTHYROXINE SODIUM 25 MCG
1 TABLET ORAL
Refills: 0 | DISCHARGE

## 2024-08-02 RX ORDER — TOPIRAMATE 50 MG/1
1 TABLET, FILM COATED ORAL
Refills: 0 | DISCHARGE
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
## 2024-08-02 NOTE — ED PROVIDER NOTE - NSFOLLOWUPINSTRUCTIONS_ED_ALL_ED_FT
Our Emergency Department Referral Coordinators will be reaching out to you in the next 24-48 hours from 9:00am to 5:00pm with a follow up appointment. Please expect a phone call from the hospital in that time frame. If you do not receive a call or if you have any questions or concerns, you can reach them at   [phone]    Hand Fracture    WHAT YOU NEED TO KNOW:    A hand fracture is a break in one of the bones in your hand. This includes the bones in the wrist and fingers, and those that connect the wrist to the fingers. A hand fracture may be caused by twisting or bending the hand in the wrong way. It may also be caused by a fall, a crush injury, or a sports injury.    DISCHARGE INSTRUCTIONS:    Call your doctor if:     Your arm feels warm, tender, and painful. It may look swollen and red.      You have severe pain that does not get better, even with pain medicine.      Your injured hand or forearm is cold, numb, or pale.      Your cast or splint gets wet, damaged, or comes off.      You have new sores around your brace, cast, or splint.      You notice a bad smell coming from under your cast.      You have questions or concerns about your condition or care.    Medicines: You may need any of the following:     NSAIDs help decrease swelling and pain or fever. This medicine is available with or without a doctor's order. NSAIDs can cause stomach bleeding or kidney problems in certain people. If you take blood thinner medicine, always ask your healthcare provider if NSAIDs are safe for you. Always read the medicine label and follow directions.      Acetaminophen decreases pain and fever. It is available without a doctor's order. Ask how much to take and how often to take it. Follow directions. Read the labels of all other medicines you are using to see if they also contain acetaminophen, or ask your doctor or pharmacist. Acetaminophen can cause liver damage if not taken correctly. Do not use more than 4 grams (4,000 milligrams) total of acetaminophen in one day.       Prescription pain medicine may be given. Ask your healthcare provider how to take this medicine safely. Some prescription pain medicines contain acetaminophen. Do not take other medicines that contain acetaminophen without talking to your healthcare provider. Too much acetaminophen may cause liver damage. Prescription pain medicine may cause constipation. Ask your healthcare provider how to prevent or treat constipation.       Take your medicine as directed. Contact your healthcare provider if you think your medicine is not helping or if you have side effects. Tell him or her if you are allergic to any medicine. Keep a list of the medicines, vitamins, and herbs you take. Include the amounts, and when and why you take them. Bring the list or the pill bottles to follow-up visits. Carry your medicine list with you in case of an emergency.    Follow up with your doctor or hand specialist as directed: You may need to return to have your cast, splint, or stitches removed. Write down your questions so you remember to ask them during your visits.    Manage your symptoms:     Wear your splint as directed. Do not remove the splint until you follow up with your healthcare provider or hand specialist.      Apply ice on your hand for 15 to 20 minutes every hour or as directed. Use an ice pack, or put crushed ice in a plastic bag. Cover it with a towel before you apply it to your skin. Ice helps prevent tissue damage and decreases swelling and pain.      Elevate your hand above the level of your heart as often as you can. This will help decrease swelling and pain. Prop your hand on pillows or blankets to keep it elevated comfortably.      Go to physical therapy as directed. A physical therapist teaches you exercises to help improve movement and strength and to decrease pain.    Bathing with a cast or splint: Do not let your cast or splint get wet. Before bathing, cover the cast or splint with a plastic bag. Tape the bag to your skin above the cast or splint to seal out the water. Keep your hand out of the water in case the bag leaks. Follow instructions about when it is okay to take a bath or shower.    Cast or splint care:     Check the skin around the cast or splint for redness or sores every day.      Do not push down or lean on any part of the cast or splint because it may break.      Do not use a sharp or pointed object to scratch your skin under the cast or splint.    Activity: You may not be able to drive for up to 2 weeks. Ask when it is safe for you to drive and return to other activities, such as sports.       © Copyright [employer] 2019 All illustrations and images included in CareNotes are the copyrighted property of [employer] or [employer].

## 2024-08-02 NOTE — ED ADULT NURSE NOTE - NSFALLRISKINTERV_ED_ALL_ED
Assistance OOB with selected safe patient handling equipment if applicable/Communicate fall risk and risk factors to all staff, patient, and family/Monitor for mental status changes and reorient to person, place, and time, as needed/Move patient closer to nursing station/within visual sight of ED staff/Provide visual cue: yellow wristband, yellow gown, etc/Reinforce activity limits and safety measures with patient and family/Toileting schedule using arm’s reach rule for commode and bathroom/Use of alarms - bed, stretcher, chair and/or video monitoring/Call bell, personal items and telephone in reach/Instruct patient to call for assistance before getting out of bed/chair/stretcher/Non-slip footwear applied when patient is off stretcher/[place] to call system/Physically safe environment - no spills, clutter or unnecessary equipment/Purposeful Proactive Rounding/Room/bathroom lighting operational, light cord in reach

## 2024-08-02 NOTE — ED ADULT NURSE NOTE - NSICDXPASTMEDICALHX_GEN_ALL_CORE_FT
PAST MEDICAL HISTORY:  Autistic disorder     Blindness of left eye     Cerebral palsy     Chronic GERD     Disruptive behavior disorder     Hypothyroidism     Impulse control disorder     Pre-existing type 2 diabetes mellitus

## 2024-08-02 NOTE — ED PROVIDER NOTE - PHYSICAL EXAMINATION
GEN: Patient in no acute distress  MS: tenderness to left hand. Normal ROM in all extremities. No midline spinal tenderness. pulses 2 +.   SKIN: swelling and bruising to dorsum of left hand. Warm, dry, no acute rashes. Good turgor  NEURO: Strength 5/5 with no sensory deficits. Steady gait.

## 2024-08-02 NOTE — ED ADULT TRIAGE NOTE - CHIEF COMPLAINT QUOTE
As per patients aide, patient has left hand fracture. Patient had a soft splint on hand but kept taking it off. Aides are requesting that patient get hard cast placed on hand.

## 2024-08-02 NOTE — ED PROVIDER NOTE - NSFOLLOWUPCLINICS_GEN_ALL_ED_FT
[facility]  Hand  [address], [address]  [place] [address]  Phone: [phone]  Fax:
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
## 2024-08-06 PROBLEM — E03.9 HYPOTHYROIDISM, UNSPECIFIED: Chronic | Status: ACTIVE | Noted: 2024-08-02

## 2024-08-06 PROBLEM — F84.0 AUTISTIC DISORDER: Chronic | Status: ACTIVE | Noted: 2024-08-02

## 2024-08-06 PROBLEM — F91.9 CONDUCT DISORDER, UNSPECIFIED: Chronic | Status: ACTIVE | Noted: 2024-08-02

## 2024-08-06 PROBLEM — E11.9 TYPE 2 DIABETES MELLITUS WITHOUT COMPLICATIONS: Chronic | Status: ACTIVE | Noted: 2024-08-02

## 2024-08-06 PROBLEM — G80.9 CEREBRAL PALSY, UNSPECIFIED: Chronic | Status: ACTIVE | Noted: 2024-08-02

## 2024-08-06 PROBLEM — K21.9 GASTRO-ESOPHAGEAL REFLUX DISEASE WITHOUT ESOPHAGITIS: Chronic | Status: ACTIVE | Noted: 2024-08-02

## 2024-08-06 PROBLEM — F63.9 IMPULSE DISORDER, UNSPECIFIED: Chronic | Status: ACTIVE | Noted: 2024-08-02

## 2024-08-06 PROBLEM — H54.40 BLINDNESS, ONE EYE, UNSPECIFIED EYE: Chronic | Status: ACTIVE | Noted: 2024-08-02

## 2024-08-13 ENCOUNTER — APPOINTMENT (OUTPATIENT)
Dept: PEDIATRIC DEVELOPMENTAL SERVICES | Facility: CLINIC | Age: 29
End: 2024-08-13
Payer: MEDICAID

## 2024-08-13 VITALS
OXYGEN SATURATION: 97 % | DIASTOLIC BLOOD PRESSURE: 80 MMHG | TEMPERATURE: 97.9 F | SYSTOLIC BLOOD PRESSURE: 115 MMHG | BODY MASS INDEX: 26.08 KG/M2 | WEIGHT: 147.19 LBS | HEART RATE: 90 BPM | HEIGHT: 63 IN

## 2024-08-13 DIAGNOSIS — D50.9 IRON DEFICIENCY ANEMIA, UNSPECIFIED: ICD-10-CM

## 2024-08-13 DIAGNOSIS — R73.03 PREDIABETES.: ICD-10-CM

## 2024-08-13 DIAGNOSIS — G80.9 CEREBRAL PALSY, UNSPECIFIED: ICD-10-CM

## 2024-08-13 DIAGNOSIS — D49.2 NEOPLASM OF UNSPECIFIED BEHAVIOR OF BONE, SOFT TISSUE, AND SKIN: ICD-10-CM

## 2024-08-13 DIAGNOSIS — F39 UNSPECIFIED MOOD [AFFECTIVE] DISORDER: ICD-10-CM

## 2024-08-13 DIAGNOSIS — L70.9 ACNE, UNSPECIFIED: ICD-10-CM

## 2024-08-13 DIAGNOSIS — Q12.0 CONGENITAL CATARACT: ICD-10-CM

## 2024-08-13 DIAGNOSIS — S62.109A FRACTURE OF UNSPECIFIED CARPAL BONE, UNSPECIFIED WRIST, INITIAL ENCOUNTER FOR CLOSED FRACTURE: ICD-10-CM

## 2024-08-13 DIAGNOSIS — E03.9 HYPOTHYROIDISM, UNSPECIFIED: ICD-10-CM

## 2024-08-13 DIAGNOSIS — S62.92XA UNSPECIFIED FRACTURE OF LEFT WRIST AND HAND, INITIAL ENCOUNTER FOR CLOSED FRACTURE: ICD-10-CM

## 2024-08-13 DIAGNOSIS — E78.1 PURE HYPERGLYCERIDEMIA: ICD-10-CM

## 2024-08-13 DIAGNOSIS — H93.8X1 OTHER SPECIFIED DISORDERS OF RIGHT EAR: ICD-10-CM

## 2024-08-13 PROCEDURE — 99214 OFFICE O/P EST MOD 30 MIN: CPT | Mod: GC

## 2024-08-13 PROCEDURE — G2211 COMPLEX E/M VISIT ADD ON: CPT | Mod: NC,1L

## 2024-08-13 NOTE — PHYSICAL EXAM
[No Acute Distress] : no acute distress [No JVD] : no jugular venous distention [No Respiratory Distress] : no respiratory distress  [Normal Rate] : normal rate  [No Carotid Bruits] : no carotid bruits [Soft] : abdomen soft [No CVA Tenderness] : no CVA  tenderness

## 2024-08-13 NOTE — HISTORY OF PRESENT ILLNESS
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  Accompanied by fidel Aguilar .  recently got a L wrist fracture on 08./02 and got a wrist splint in Ed . No complaints today.

## 2024-08-13 NOTE — ASSESSMENT
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  #L hand fracture  -got fracture on 08/02 -put the splints on by Ed  -pt was seen by orthopedics outside , who took the splint , ask staff to submit documentation   #Microcytic anemia - Hb 13.7, MCV 74.9 (3/24) - Iron studies done > ferritin 141 , other iron studies wnl.  #PreDM - HgA1C 6.0 (March 2023) - Diet and lifestyle modifications , low carb high fiber diet, Dash diet  #Hypertriglyceridemia - lipid profile done on 11/2023 showed  , LDL 95 - lipid profile done on 3/2024 showed , LDL 99 - diet and lifestyle modifications, no indication for medications for now - low carb high fiber diet , DASH diet  #Hypothyroidism -- primary - TSH 6.58 -> 3.46 - c/w levothyroxine 25 mcg  # Mood disorder - c/w current meds - the patient is verbal with intellectual disability  #cerebral palsy #seizure disorder - cw current meds. cw following with neurology  # Acne: - Continue Clindamycin 1% gel 2 times a day after showering - Derm following (Dr. Tellez)  # Left foot tumor # onychomycosis - Unknown primary (no tissue biopsy) - s/p 2nd left toe amputation - Podiatry last visit 6/28/2022  #Clogged ear, right #Impacted cerumen of right ear - Followed by [facility] - seen on March 6th 2024 > To use Debrox for right ear (M, W, F) - Visit scheduled 9/4/24 - RTC in 6 months.  # Congenital left eye cataract and blindness - Following Ophthalmology  #HCM: - Flu shot given 10/18/2023 - Covid-19 2 doses in feb 2021 - Tdap vaccine given sep 2021 --> repeat in sep 2031 - RTC in 3 mo with labs.

## 2024-08-13 NOTE — END OF VISIT
[] : Resident [FreeTextEntry3] : Pt. was seen in ER 8/2/24 for left 2nd distal metacarpal fracture, here for follow up.  Seen private ortho, and has splint placed.  Had Tdap 9/23/21, flu vaccine 10/18/23.  Pt. is following by psych as per staff.  Seen ophthal. 7/30/24, and podiatry 7/16/24.  Medication renewed.  Advised staff to complete blood work prior to next visit.  Follow private ortho.  Follow ENT 9/4/24, podiatry 9/24/24, derm 2/6/25 for acne, ophthal. 7/30/25 for congenital cataract.  RTC 3 months.

## 2024-08-15 ENCOUNTER — APPOINTMENT (OUTPATIENT)
Dept: DERMATOLOGY | Facility: CLINIC | Age: 29
End: 2024-08-15

## 2024-08-23 ENCOUNTER — APPOINTMENT (OUTPATIENT)
Dept: PLASTIC SURGERY | Facility: CLINIC | Age: 29
End: 2024-08-23

## 2024-09-04 ENCOUNTER — APPOINTMENT (OUTPATIENT)
Dept: OTOLARYNGOLOGY | Facility: CLINIC | Age: 29
End: 2024-09-04
Payer: MEDICAID

## 2024-09-04 DIAGNOSIS — H93.8X1 OTHER SPECIFIED DISORDERS OF RIGHT EAR: ICD-10-CM

## 2024-09-04 DIAGNOSIS — H61.21 IMPACTED CERUMEN, RIGHT EAR: ICD-10-CM

## 2024-09-04 PROCEDURE — 99213 OFFICE O/P EST LOW 20 MIN: CPT

## 2024-09-04 NOTE — ASSESSMENT
[FreeTextEntry1] : Partially cleaned ears. Patient adamant to ear cleaning. To use Debrox for right ear (M, W, F)  RTC in 6 months.

## 2024-09-04 NOTE — PHYSICAL EXAM
[Normal] : palatine tonsils are normal [Midline] : trachea located in midline position [de-identified] : cerumen impaction of right ear partially removed with curette. Patient adamant for ear cleaning

## 2024-09-04 NOTE — HISTORY OF PRESENT ILLNESS
[FreeTextEntry1] : Patient presets today c/o clogged ears with impacted cerumen. Patient accompanied by aide. Aide denies patient having any further ENT issues.

## 2024-10-01 ENCOUNTER — OUTPATIENT (OUTPATIENT)
Dept: OUTPATIENT SERVICES | Facility: HOSPITAL | Age: 29
LOS: 1 days | End: 2024-10-01
Payer: MEDICAID

## 2024-10-01 ENCOUNTER — APPOINTMENT (OUTPATIENT)
Dept: PODIATRY | Facility: CLINIC | Age: 29
End: 2024-10-01
Payer: MEDICAID

## 2024-10-01 DIAGNOSIS — Z00.00 ENCOUNTER FOR GENERAL ADULT MEDICAL EXAMINATION WITHOUT ABNORMAL FINDINGS: ICD-10-CM

## 2024-10-01 PROCEDURE — 11721 DEBRIDE NAIL 6 OR MORE: CPT

## 2024-10-01 NOTE — PHYSICAL EXAM
[de-identified] : CAvus foot. b/l [Skin Color & Pigmentation] : normal skin color and pigmentation [FreeTextEntry1] : elongated nail x9.  [Sensation] : the sensory exam was normal to light touch and pinprick
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
## 2024-10-10 DIAGNOSIS — M79.671 PAIN IN RIGHT FOOT: ICD-10-CM
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
## 2024-10-10 DIAGNOSIS — B35.1 TINEA UNGUIUM: ICD-10-CM

## 2024-10-10 DIAGNOSIS — M79.672 PAIN IN LEFT FOOT: ICD-10-CM

## 2024-10-17 ENCOUNTER — APPOINTMENT (OUTPATIENT)
Dept: PEDIATRIC DEVELOPMENTAL SERVICES | Facility: CLINIC | Age: 29
End: 2024-10-17

## 2024-11-07 ENCOUNTER — APPOINTMENT (OUTPATIENT)
Dept: PEDIATRIC DEVELOPMENTAL SERVICES | Facility: CLINIC | Age: 29
End: 2024-11-07
Payer: MEDICAID

## 2024-11-07 ENCOUNTER — OUTPATIENT (OUTPATIENT)
Dept: OUTPATIENT SERVICES | Facility: HOSPITAL | Age: 29
LOS: 1 days | End: 2024-11-07
Payer: MEDICAID

## 2024-11-07 VITALS
SYSTOLIC BLOOD PRESSURE: 106 MMHG | WEIGHT: 146 LBS | HEIGHT: 63 IN | DIASTOLIC BLOOD PRESSURE: 72 MMHG | OXYGEN SATURATION: 100 % | HEART RATE: 75 BPM | BODY MASS INDEX: 25.87 KG/M2 | TEMPERATURE: 97.8 F

## 2024-11-07 DIAGNOSIS — H54.40 BLINDNESS, ONE EYE, UNSPECIFIED EYE: ICD-10-CM

## 2024-11-07 DIAGNOSIS — F84.0 AUTISTIC DISORDER: ICD-10-CM

## 2024-11-07 DIAGNOSIS — Z00.00 ENCOUNTER FOR GENERAL ADULT MEDICAL EXAMINATION W/OUT ABNORMAL FINDINGS: ICD-10-CM

## 2024-11-07 DIAGNOSIS — Z23 ENCOUNTER FOR IMMUNIZATION: ICD-10-CM

## 2024-11-07 DIAGNOSIS — E03.9 HYPOTHYROIDISM, UNSPECIFIED: ICD-10-CM

## 2024-11-07 DIAGNOSIS — G80.9 CEREBRAL PALSY, UNSPECIFIED: ICD-10-CM

## 2024-11-07 DIAGNOSIS — Z00.00 ENCOUNTER FOR GENERAL ADULT MEDICAL EXAMINATION WITHOUT ABNORMAL FINDINGS: ICD-10-CM

## 2024-11-07 DIAGNOSIS — D50.9 IRON DEFICIENCY ANEMIA, UNSPECIFIED: ICD-10-CM

## 2024-11-07 DIAGNOSIS — E78.1 PURE HYPERGLYCERIDEMIA: ICD-10-CM

## 2024-11-07 DIAGNOSIS — R73.03 PREDIABETES.: ICD-10-CM

## 2024-11-07 DIAGNOSIS — H61.21 IMPACTED CERUMEN, RIGHT EAR: ICD-10-CM

## 2024-11-07 PROCEDURE — 99214 OFFICE O/P EST MOD 30 MIN: CPT | Mod: GC

## 2024-11-07 PROCEDURE — 90471 IMMUNIZATION ADMIN: CPT

## 2024-11-07 PROCEDURE — 99214 OFFICE O/P EST MOD 30 MIN: CPT | Mod: 25

## 2024-11-12 DIAGNOSIS — Z23 ENCOUNTER FOR IMMUNIZATION: ICD-10-CM

## 2024-11-12 DIAGNOSIS — H54.40 BLINDNESS, ONE EYE, UNSPECIFIED EYE: ICD-10-CM

## 2024-11-12 DIAGNOSIS — R73.03 PREDIABETES: ICD-10-CM

## 2024-11-12 DIAGNOSIS — Z00.00 ENCOUNTER FOR GENERAL ADULT MEDICAL EXAMINATION WITHOUT ABNORMAL FINDINGS: ICD-10-CM

## 2024-11-12 DIAGNOSIS — E78.1 PURE HYPERGLYCERIDEMIA: ICD-10-CM

## 2024-11-12 DIAGNOSIS — D50.9 IRON DEFICIENCY ANEMIA, UNSPECIFIED: ICD-10-CM

## 2024-11-12 DIAGNOSIS — E03.9 HYPOTHYROIDISM, UNSPECIFIED: ICD-10-CM

## 2024-12-03 ENCOUNTER — APPOINTMENT (OUTPATIENT)
Dept: PODIATRY | Facility: CLINIC | Age: 29
End: 2024-12-03
Payer: MEDICAID

## 2024-12-03 ENCOUNTER — OUTPATIENT (OUTPATIENT)
Dept: OUTPATIENT SERVICES | Facility: HOSPITAL | Age: 29
LOS: 1 days | End: 2024-12-03
Payer: MEDICAID

## 2024-12-03 DIAGNOSIS — M79.674 PAIN IN RIGHT TOE(S): ICD-10-CM

## 2024-12-03 DIAGNOSIS — B35.1 TINEA UNGUIUM: ICD-10-CM

## 2024-12-03 DIAGNOSIS — M79.675 PAIN IN RIGHT TOE(S): ICD-10-CM

## 2024-12-03 DIAGNOSIS — Z00.00 ENCOUNTER FOR GENERAL ADULT MEDICAL EXAMINATION WITHOUT ABNORMAL FINDINGS: ICD-10-CM

## 2024-12-03 PROCEDURE — 11721 DEBRIDE NAIL 6 OR MORE: CPT
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
## 2024-12-10 DIAGNOSIS — B35.1 TINEA UNGUIUM: ICD-10-CM
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
## 2024-12-10 DIAGNOSIS — M79.674 PAIN IN RIGHT TOE(S): ICD-10-CM

## 2024-12-19 ENCOUNTER — RX RENEWAL (OUTPATIENT)
Age: 29
End: 2024-12-19

## 2025-01-28 ENCOUNTER — OUTPATIENT (OUTPATIENT)
Dept: OUTPATIENT SERVICES | Facility: HOSPITAL | Age: 30
LOS: 1 days | End: 2025-01-28
Payer: MEDICAID

## 2025-01-28 DIAGNOSIS — R73.03 PREDIABETES: ICD-10-CM

## 2025-01-28 PROCEDURE — 84443 ASSAY THYROID STIM HORMONE: CPT

## 2025-01-28 PROCEDURE — 83036 HEMOGLOBIN GLYCOSYLATED A1C: CPT

## 2025-01-28 PROCEDURE — 80061 LIPID PANEL: CPT

## 2025-01-28 PROCEDURE — 80053 COMPREHEN METABOLIC PANEL: CPT

## 2025-01-28 PROCEDURE — 85027 COMPLETE CBC AUTOMATED: CPT

## 2025-01-29 DIAGNOSIS — R73.03 PREDIABETES: ICD-10-CM

## 2025-02-06 ENCOUNTER — OUTPATIENT (OUTPATIENT)
Dept: OUTPATIENT SERVICES | Facility: HOSPITAL | Age: 30
LOS: 1 days | End: 2025-02-06
Payer: MEDICAID

## 2025-02-06 ENCOUNTER — APPOINTMENT (OUTPATIENT)
Dept: DERMATOLOGY | Facility: CLINIC | Age: 30
End: 2025-02-06

## 2025-02-06 DIAGNOSIS — Z00.00 ENCOUNTER FOR GENERAL ADULT MEDICAL EXAMINATION WITHOUT ABNORMAL FINDINGS: ICD-10-CM

## 2025-02-06 DIAGNOSIS — L70.9 ACNE, UNSPECIFIED: ICD-10-CM

## 2025-02-06 PROCEDURE — 99213 OFFICE O/P EST LOW 20 MIN: CPT

## 2025-02-12 DIAGNOSIS — L70.9 ACNE, UNSPECIFIED: ICD-10-CM

## 2025-02-18 ENCOUNTER — OUTPATIENT (OUTPATIENT)
Dept: OUTPATIENT SERVICES | Facility: HOSPITAL | Age: 30
LOS: 1 days | End: 2025-02-18
Payer: MEDICAID

## 2025-02-18 ENCOUNTER — APPOINTMENT (OUTPATIENT)
Dept: PODIATRY | Facility: CLINIC | Age: 30
End: 2025-02-18
Payer: MEDICAID

## 2025-02-18 ENCOUNTER — APPOINTMENT (OUTPATIENT)
Dept: PEDIATRIC DEVELOPMENTAL SERVICES | Facility: CLINIC | Age: 30
End: 2025-02-18
Payer: MEDICAID

## 2025-02-18 VITALS
DIASTOLIC BLOOD PRESSURE: 70 MMHG | OXYGEN SATURATION: 97 % | HEART RATE: 89 BPM | BODY MASS INDEX: 26.41 KG/M2 | TEMPERATURE: 97 F | HEIGHT: 63 IN | SYSTOLIC BLOOD PRESSURE: 112 MMHG | WEIGHT: 149.06 LBS

## 2025-02-18 DIAGNOSIS — G80.9 CEREBRAL PALSY, UNSPECIFIED: ICD-10-CM

## 2025-02-18 DIAGNOSIS — F39 UNSPECIFIED MOOD [AFFECTIVE] DISORDER: ICD-10-CM

## 2025-02-18 DIAGNOSIS — F84.0 AUTISTIC DISORDER: ICD-10-CM

## 2025-02-18 DIAGNOSIS — D50.9 IRON DEFICIENCY ANEMIA, UNSPECIFIED: ICD-10-CM

## 2025-02-18 DIAGNOSIS — E78.1 PURE HYPERGLYCERIDEMIA: ICD-10-CM

## 2025-02-18 DIAGNOSIS — M79.674 PAIN IN RIGHT TOE(S): ICD-10-CM

## 2025-02-18 DIAGNOSIS — Z00.00 ENCOUNTER FOR GENERAL ADULT MEDICAL EXAMINATION WITHOUT ABNORMAL FINDINGS: ICD-10-CM

## 2025-02-18 DIAGNOSIS — R73.03 PREDIABETES.: ICD-10-CM
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
## 2025-02-18 DIAGNOSIS — E03.9 HYPOTHYROIDISM, UNSPECIFIED: ICD-10-CM

## 2025-02-18 DIAGNOSIS — M79.675 PAIN IN RIGHT TOE(S): ICD-10-CM

## 2025-02-18 DIAGNOSIS — B35.1 TINEA UNGUIUM: ICD-10-CM

## 2025-02-18 PROCEDURE — 99214 OFFICE O/P EST MOD 30 MIN: CPT | Mod: 25

## 2025-02-18 PROCEDURE — 99214 OFFICE O/P EST MOD 30 MIN: CPT | Mod: GC

## 2025-02-18 PROCEDURE — 11721 DEBRIDE NAIL 6 OR MORE: CPT

## 2025-02-19 DIAGNOSIS — E78.1 PURE HYPERGLYCERIDEMIA: ICD-10-CM

## 2025-02-19 DIAGNOSIS — D50.9 IRON DEFICIENCY ANEMIA, UNSPECIFIED: ICD-10-CM

## 2025-02-19 DIAGNOSIS — R73.03 PREDIABETES: ICD-10-CM

## 2025-02-19 DIAGNOSIS — E03.9 HYPOTHYROIDISM, UNSPECIFIED: ICD-10-CM

## 2025-03-05 ENCOUNTER — APPOINTMENT (OUTPATIENT)
Dept: OTOLARYNGOLOGY | Facility: CLINIC | Age: 30
End: 2025-03-05
Payer: MEDICAID

## 2025-03-05 VITALS — BODY MASS INDEX: 26.4 KG/M2 | WEIGHT: 149 LBS | HEIGHT: 63 IN

## 2025-03-05 DIAGNOSIS — H61.21 IMPACTED CERUMEN, RIGHT EAR: ICD-10-CM

## 2025-03-05 DIAGNOSIS — H61.22 IMPACTED CERUMEN, LEFT EAR: ICD-10-CM

## 2025-03-05 PROCEDURE — 99213 OFFICE O/P EST LOW 20 MIN: CPT

## 2025-03-06 ENCOUNTER — EMERGENCY (EMERGENCY)
Facility: HOSPITAL | Age: 30
LOS: 0 days | Discharge: HOME | End: 2025-03-06
Attending: EMERGENCY MEDICINE
Payer: MEDICAID

## 2025-03-06 VITALS
HEART RATE: 88 BPM | OXYGEN SATURATION: 100 % | SYSTOLIC BLOOD PRESSURE: 123 MMHG | TEMPERATURE: 98 F | DIASTOLIC BLOOD PRESSURE: 85 MMHG | RESPIRATION RATE: 18 BRPM

## 2025-03-06 VITALS
RESPIRATION RATE: 18 BRPM | TEMPERATURE: 98 F | OXYGEN SATURATION: 100 % | DIASTOLIC BLOOD PRESSURE: 89 MMHG | HEART RATE: 112 BPM | SYSTOLIC BLOOD PRESSURE: 135 MMHG

## 2025-03-06 DIAGNOSIS — F79 UNSPECIFIED INTELLECTUAL DISABILITIES: ICD-10-CM

## 2025-03-06 DIAGNOSIS — E03.9 HYPOTHYROIDISM, UNSPECIFIED: ICD-10-CM

## 2025-03-06 DIAGNOSIS — M25.572 PAIN IN LEFT ANKLE AND JOINTS OF LEFT FOOT: ICD-10-CM

## 2025-03-06 DIAGNOSIS — Q21.10 ATRIAL SEPTAL DEFECT, UNSPECIFIED: ICD-10-CM

## 2025-03-06 DIAGNOSIS — M25.472 EFFUSION, LEFT ANKLE: ICD-10-CM

## 2025-03-06 DIAGNOSIS — G80.9 CEREBRAL PALSY, UNSPECIFIED: ICD-10-CM

## 2025-03-06 PROCEDURE — 99284 EMERGENCY DEPT VISIT MOD MDM: CPT

## 2025-03-06 PROCEDURE — 73610 X-RAY EXAM OF ANKLE: CPT | Mod: LT

## 2025-03-06 PROCEDURE — 99283 EMERGENCY DEPT VISIT LOW MDM: CPT | Mod: 25

## 2025-03-06 PROCEDURE — 73610 X-RAY EXAM OF ANKLE: CPT | Mod: 26,LT

## 2025-03-06 NOTE — ED PROVIDER NOTE - ATTENDING APP SHARED VISIT CONTRIBUTION OF CARE
Patient with left ankle pain.  Patient is ambulatory.  There is no history of trauma.  He has a history of autism and cerebral palsy.  On exam there are some mild swelling of the left lateral malleolus and a traumatic amputation of the second toe.  Distal pulses are intact.  Plan will be x-ray

## 2025-03-06 NOTE — ED PROVIDER NOTE - PATIENT PORTAL LINK FT
You can access the FollowMyHealth Patient Portal offered by [facility] by registering at the following website: [email or web address]. By joining [facility]’s FollowMyHealth portal, you will also be able to view your health information using other applications (apps) compatible with our system.

## 2025-03-06 NOTE — ED PROVIDER NOTE - CARE PROVIDER_API CALL
Criselda Avalos  Internal Medicine  [address] [place] [address]  Phone: [phone]  Fax: [phone]  Follow Up Time: 1-3 Days

## 2025-03-06 NOTE — ED PROVIDER NOTE - PHYSICAL EXAMINATION
VITAL SIGNS: I have reviewed nursing notes and confirm.  CONSTITUTIONAL: 30 yo M laying on stretcher; in no acute distress.  SKIN: Skin exam is warm and dry, no acute rash.  HEAD: Normocephalic; atraumatic.  EYES: Conjunctiva and sclera clear.  ENT: No nasal discharge; airway clear.  CARD: Regular rate and rhythm.  RESP: Speaking in full sentences.   EXT: Normal ROM. (+) mild swelling to L lateral malleolus, no deformity/TTP. DP 2+.   NEURO: Alert, oriented. Grossly unremarkable. No focal deficits.

## 2025-03-06 NOTE — ED PROVIDER NOTE - OBJECTIVE STATEMENT
29-year-old male with past medical history of CP, intellectual disability, ASD, hypothyroidism presents to the ED from long-term for evaluation of left ankle pain.  Staff denies any history of trauma, patient poor historian.  Patient ambulating without any difficulty.

## 2025-03-09 NOTE — ED POST DISCHARGE NOTE - DETAILS
spoke with staff member at facility regarding xray finding, offered pt o return for splint or close outpatient ortho f/u this week

## 2025-03-19 ENCOUNTER — APPOINTMENT (OUTPATIENT)
Dept: PEDIATRIC DEVELOPMENTAL SERVICES | Facility: CLINIC | Age: 30
End: 2025-03-19
Payer: MEDICAID

## 2025-03-19 ENCOUNTER — OUTPATIENT (OUTPATIENT)
Dept: OUTPATIENT SERVICES | Facility: HOSPITAL | Age: 30
LOS: 1 days | End: 2025-03-19
Payer: MEDICAID

## 2025-03-19 VITALS
TEMPERATURE: 97.1 F | HEART RATE: 78 BPM | SYSTOLIC BLOOD PRESSURE: 105 MMHG | HEIGHT: 63 IN | WEIGHT: 144.5 LBS | DIASTOLIC BLOOD PRESSURE: 66 MMHG | OXYGEN SATURATION: 97 % | BODY MASS INDEX: 25.6 KG/M2

## 2025-03-19 DIAGNOSIS — D50.9 IRON DEFICIENCY ANEMIA, UNSPECIFIED: ICD-10-CM

## 2025-03-19 DIAGNOSIS — E03.9 HYPOTHYROIDISM, UNSPECIFIED: ICD-10-CM

## 2025-03-19 DIAGNOSIS — H93.8X1 OTHER SPECIFIED DISORDERS OF RIGHT EAR: ICD-10-CM

## 2025-03-19 DIAGNOSIS — R73.03 PREDIABETES.: ICD-10-CM

## 2025-03-19 DIAGNOSIS — Q12.0 CONGENITAL CATARACT: ICD-10-CM

## 2025-03-19 DIAGNOSIS — Z00.00 ENCOUNTER FOR GENERAL ADULT MEDICAL EXAMINATION WITHOUT ABNORMAL FINDINGS: ICD-10-CM

## 2025-03-19 DIAGNOSIS — L70.9 ACNE, UNSPECIFIED: ICD-10-CM

## 2025-03-19 DIAGNOSIS — H54.40 BLINDNESS, ONE EYE, UNSPECIFIED EYE: ICD-10-CM

## 2025-03-19 DIAGNOSIS — E78.1 PURE HYPERGLYCERIDEMIA: ICD-10-CM

## 2025-03-19 PROCEDURE — 99214 OFFICE O/P EST MOD 30 MIN: CPT

## 2025-03-19 PROCEDURE — 99214 OFFICE O/P EST MOD 30 MIN: CPT | Mod: GC

## 2025-03-25 ENCOUNTER — APPOINTMENT (OUTPATIENT)
Dept: PODIATRY | Facility: CLINIC | Age: 30
End: 2025-03-25
Payer: MEDICAID

## 2025-03-25 ENCOUNTER — OUTPATIENT (OUTPATIENT)
Dept: OUTPATIENT SERVICES | Facility: HOSPITAL | Age: 30
LOS: 1 days | End: 2025-03-25
Payer: MEDICAID

## 2025-03-25 DIAGNOSIS — Z00.00 ENCOUNTER FOR GENERAL ADULT MEDICAL EXAMINATION WITHOUT ABNORMAL FINDINGS: ICD-10-CM

## 2025-03-25 DIAGNOSIS — M79.674 PAIN IN RIGHT TOE(S): ICD-10-CM

## 2025-03-25 DIAGNOSIS — D50.9 IRON DEFICIENCY ANEMIA, UNSPECIFIED: ICD-10-CM

## 2025-03-25 DIAGNOSIS — E78.1 PURE HYPERGLYCERIDEMIA: ICD-10-CM

## 2025-03-25 DIAGNOSIS — R73.03 PREDIABETES: ICD-10-CM

## 2025-03-25 DIAGNOSIS — F84.0 AUTISTIC DISORDER: ICD-10-CM

## 2025-03-25 DIAGNOSIS — E03.9 HYPOTHYROIDISM, UNSPECIFIED: ICD-10-CM

## 2025-03-25 DIAGNOSIS — F39 UNSPECIFIED MOOD [AFFECTIVE] DISORDER: ICD-10-CM

## 2025-03-25 DIAGNOSIS — H54.40 BLINDNESS, ONE EYE, UNSPECIFIED EYE: ICD-10-CM

## 2025-03-25 DIAGNOSIS — S82.892A OTHER FRACTURE OF LEFT LOWER LEG, INITIAL ENCOUNTER FOR CLOSED FRACTURE: ICD-10-CM

## 2025-03-25 DIAGNOSIS — L70.9 ACNE, UNSPECIFIED: ICD-10-CM

## 2025-03-25 DIAGNOSIS — M79.675 PAIN IN RIGHT TOE(S): ICD-10-CM

## 2025-03-25 PROCEDURE — 99213 OFFICE O/P EST LOW 20 MIN: CPT | Mod: 25

## 2025-03-25 PROCEDURE — 11721 DEBRIDE NAIL 6 OR MORE: CPT

## 2025-04-30 ENCOUNTER — APPOINTMENT (OUTPATIENT)
Dept: ORTHOPEDIC SURGERY | Facility: CLINIC | Age: 30
End: 2025-04-30
Payer: MEDICAID

## 2025-04-30 ENCOUNTER — OUTPATIENT (OUTPATIENT)
Dept: OUTPATIENT SERVICES | Facility: HOSPITAL | Age: 30
LOS: 1 days | End: 2025-04-30
Payer: MEDICAID

## 2025-04-30 ENCOUNTER — RESULT REVIEW (OUTPATIENT)
Age: 30
End: 2025-04-30

## 2025-04-30 DIAGNOSIS — Z00.00 ENCOUNTER FOR GENERAL ADULT MEDICAL EXAMINATION WITHOUT ABNORMAL FINDINGS: ICD-10-CM

## 2025-04-30 PROCEDURE — 73610 X-RAY EXAM OF ANKLE: CPT | Mod: LT

## 2025-04-30 PROCEDURE — 73610 X-RAY EXAM OF ANKLE: CPT | Mod: 26,LT

## 2025-04-30 PROCEDURE — 99203 OFFICE O/P NEW LOW 30 MIN: CPT
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
## 2025-05-02 DIAGNOSIS — S82.892A OTHER FRACTURE OF LEFT LOWER LEG, INITIAL ENCOUNTER FOR CLOSED FRACTURE: ICD-10-CM

## 2025-05-20 ENCOUNTER — OUTPATIENT (OUTPATIENT)
Dept: OUTPATIENT SERVICES | Facility: HOSPITAL | Age: 30
LOS: 1 days | End: 2025-05-20
Payer: MEDICAID

## 2025-05-20 DIAGNOSIS — Z00.00 ENCOUNTER FOR GENERAL ADULT MEDICAL EXAMINATION WITHOUT ABNORMAL FINDINGS: ICD-10-CM

## 2025-05-20 PROCEDURE — 85027 COMPLETE CBC AUTOMATED: CPT

## 2025-05-20 PROCEDURE — 83036 HEMOGLOBIN GLYCOSYLATED A1C: CPT

## 2025-05-20 PROCEDURE — 84443 ASSAY THYROID STIM HORMONE: CPT

## 2025-05-20 PROCEDURE — 80053 COMPREHEN METABOLIC PANEL: CPT

## 2025-05-20 PROCEDURE — 80061 LIPID PANEL: CPT

## 2025-05-20 PROCEDURE — 82306 VITAMIN D 25 HYDROXY: CPT

## 2025-05-21 DIAGNOSIS — Z00.00 ENCOUNTER FOR GENERAL ADULT MEDICAL EXAMINATION WITHOUT ABNORMAL FINDINGS: ICD-10-CM

## 2025-06-10 ENCOUNTER — OUTPATIENT (OUTPATIENT)
Dept: OUTPATIENT SERVICES | Facility: HOSPITAL | Age: 30
LOS: 1 days | End: 2025-06-10
Payer: MEDICAID

## 2025-06-10 ENCOUNTER — APPOINTMENT (OUTPATIENT)
Dept: PODIATRY | Facility: CLINIC | Age: 30
End: 2025-06-10
Payer: MEDICAID

## 2025-06-10 DIAGNOSIS — Z00.00 ENCOUNTER FOR GENERAL ADULT MEDICAL EXAMINATION WITHOUT ABNORMAL FINDINGS: ICD-10-CM

## 2025-06-10 DIAGNOSIS — L60.2 ONYCHOGRYPHOSIS: ICD-10-CM
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
## 2025-06-10 DIAGNOSIS — M79.674 PAIN IN RIGHT TOE(S): ICD-10-CM

## 2025-06-10 DIAGNOSIS — B35.1 TINEA UNGUIUM: ICD-10-CM

## 2025-06-10 DIAGNOSIS — F84.0 AUTISTIC DISORDER: ICD-10-CM
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
## 2025-06-10 PROCEDURE — 11721 DEBRIDE NAIL 6 OR MORE: CPT

## 2025-06-23 ENCOUNTER — APPOINTMENT (OUTPATIENT)
Dept: PEDIATRIC DEVELOPMENTAL SERVICES | Facility: CLINIC | Age: 30
End: 2025-06-23
Payer: MEDICARE

## 2025-06-23 ENCOUNTER — OUTPATIENT (OUTPATIENT)
Dept: OUTPATIENT SERVICES | Facility: HOSPITAL | Age: 30
LOS: 1 days | End: 2025-06-23
Payer: MEDICARE

## 2025-06-23 VITALS
SYSTOLIC BLOOD PRESSURE: 97 MMHG | TEMPERATURE: 98.1 F | HEART RATE: 81 BPM | OXYGEN SATURATION: 97 % | WEIGHT: 141.5 LBS | BODY MASS INDEX: 25.07 KG/M2 | HEIGHT: 63 IN | DIASTOLIC BLOOD PRESSURE: 64 MMHG

## 2025-06-23 VITALS — SYSTOLIC BLOOD PRESSURE: 102 MMHG | DIASTOLIC BLOOD PRESSURE: 70 MMHG

## 2025-06-23 DIAGNOSIS — Z00.00 ENCOUNTER FOR GENERAL ADULT MEDICAL EXAMINATION WITHOUT ABNORMAL FINDINGS: ICD-10-CM

## 2025-06-23 PROCEDURE — 99204 OFFICE O/P NEW MOD 45 MIN: CPT | Mod: GC

## 2025-06-23 PROCEDURE — 99214 OFFICE O/P EST MOD 30 MIN: CPT

## 2025-07-01 DIAGNOSIS — R73.03 PREDIABETES: ICD-10-CM

## 2025-07-01 DIAGNOSIS — S82.892A OTHER FRACTURE OF LEFT LOWER LEG, INITIAL ENCOUNTER FOR CLOSED FRACTURE: ICD-10-CM

## 2025-07-01 DIAGNOSIS — E78.1 PURE HYPERGLYCERIDEMIA: ICD-10-CM

## 2025-07-01 DIAGNOSIS — D50.9 IRON DEFICIENCY ANEMIA, UNSPECIFIED: ICD-10-CM

## 2025-07-01 DIAGNOSIS — L70.9 ACNE, UNSPECIFIED: ICD-10-CM

## 2025-07-01 DIAGNOSIS — Q12.0 CONGENITAL CATARACT: ICD-10-CM

## 2025-07-01 DIAGNOSIS — E03.9 HYPOTHYROIDISM, UNSPECIFIED: ICD-10-CM

## 2025-07-31 ENCOUNTER — APPOINTMENT (OUTPATIENT)
Dept: OPHTHALMOLOGY | Facility: CLINIC | Age: 30
End: 2025-07-31
Payer: MEDICARE

## 2025-07-31 ENCOUNTER — OUTPATIENT (OUTPATIENT)
Dept: OUTPATIENT SERVICES | Facility: HOSPITAL | Age: 30
LOS: 1 days | End: 2025-07-31
Payer: MEDICARE

## 2025-07-31 DIAGNOSIS — H53.8 OTHER VISUAL DISTURBANCES: ICD-10-CM

## 2025-07-31 PROCEDURE — 92014 COMPRE OPH EXAM EST PT 1/>: CPT

## 2025-07-31 PROCEDURE — 92004 COMPRE OPH EXAM NEW PT 1/>: CPT

## 2025-08-12 DIAGNOSIS — Q12.0 CONGENITAL CATARACT: ICD-10-CM

## 2025-08-12 DIAGNOSIS — H44.522 ATROPHY OF GLOBE, LEFT EYE: ICD-10-CM

## 2025-08-26 ENCOUNTER — OUTPATIENT (OUTPATIENT)
Dept: OUTPATIENT SERVICES | Facility: HOSPITAL | Age: 30
LOS: 1 days | End: 2025-08-26
Payer: MEDICARE

## 2025-08-26 ENCOUNTER — APPOINTMENT (OUTPATIENT)
Dept: PODIATRY | Facility: CLINIC | Age: 30
End: 2025-08-26
Payer: MEDICAID
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
## 2025-08-26 DIAGNOSIS — B35.1 TINEA UNGUIUM: ICD-10-CM
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
## 2025-08-26 DIAGNOSIS — M79.675 PAIN IN RIGHT TOE(S): ICD-10-CM

## 2025-08-26 DIAGNOSIS — M79.674 PAIN IN RIGHT TOE(S): ICD-10-CM

## 2025-08-26 DIAGNOSIS — Z00.00 ENCOUNTER FOR GENERAL ADULT MEDICAL EXAMINATION WITHOUT ABNORMAL FINDINGS: ICD-10-CM

## 2025-08-26 PROCEDURE — 11721 DEBRIDE NAIL 6 OR MORE: CPT

## 2025-09-02 ENCOUNTER — APPOINTMENT (OUTPATIENT)
Dept: OTOLARYNGOLOGY | Facility: CLINIC | Age: 30
End: 2025-09-02
Payer: MEDICARE

## 2025-09-02 DIAGNOSIS — H61.21 IMPACTED CERUMEN, RIGHT EAR: ICD-10-CM

## 2025-09-02 DIAGNOSIS — F84.0 AUTISTIC DISORDER: ICD-10-CM

## 2025-09-02 PROCEDURE — 99203 OFFICE O/P NEW LOW 30 MIN: CPT

## 2025-09-02 RX ORDER — ASPIRIN 81 MG
6.5 TABLET, DELAYED RELEASE (ENTERIC COATED) ORAL
Qty: 3 | Refills: 5 | Status: ACTIVE | COMMUNITY
Start: 2025-09-02 | End: 1900-01-01

## 2025-09-23 PROBLEM — R05.8 OTHER COUGH: Status: ACTIVE | Noted: 2025-09-23
